# Patient Record
Sex: MALE | Race: WHITE | NOT HISPANIC OR LATINO | Employment: OTHER | URBAN - METROPOLITAN AREA
[De-identification: names, ages, dates, MRNs, and addresses within clinical notes are randomized per-mention and may not be internally consistent; named-entity substitution may affect disease eponyms.]

---

## 2019-09-27 ENCOUNTER — TRANSFERRED RECORDS (OUTPATIENT)
Dept: HEALTH INFORMATION MANAGEMENT | Facility: CLINIC | Age: 59
End: 2019-09-27

## 2019-09-27 LAB
ALT SERPL-CCNC: 24 U/L (ref 18–65)
AST SERPL-CCNC: 24 U/L (ref 10–40)
CHOLEST SERPL-MCNC: 227 MG/DL
CREAT SERPL-MCNC: 0.95 MG/DL (ref 0.8–1.5)
GLUCOSE SERPL-MCNC: 99 MG/DL (ref 60–99)
HDLC SERPL-MCNC: 49 MG/DL
LDLC SERPL CALC-MCNC: 163 MG/DL
POTASSIUM SERPL-SCNC: 4.2 MEQ/L (ref 3.5–5.1)
TRIGL SERPL-MCNC: 77 MG/DL

## 2019-11-13 ENCOUNTER — TRANSFERRED RECORDS (OUTPATIENT)
Dept: HEALTH INFORMATION MANAGEMENT | Facility: CLINIC | Age: 59
End: 2019-11-13

## 2020-12-02 NOTE — PROGRESS NOTES
Subjective     Luis Max is a 60 year old male who presents to clinic today for the following health issues:    HPI         New Patient/Transfer of Care  Hyperlipidemia Follow-Up      Are you regularly taking any medication or supplement to lower your cholesterol?   No    Are you having muscle aches or other side effects that you think could be caused by your cholesterol lowering medication?  No     Patient was previously prescribed a medication for lowering lipids (he thinks Lipitor).  He states he developed muscle aches, and due to his wife's health issues (dementia) and his need to work on remodeling projects in his home, he stopped the medication on his own.  After discussion, he understands the benefit of medication and if needed, is willing to try a different statin.      Patient Active Problem List   Diagnosis     Hyperlipidemia, unspecified hyperlipidemia type     Cataract     Colon polyps     Migraines     Past Surgical History:   Procedure Laterality Date     HERNIA REPAIR  1995     HERNIA REPAIR  1993       Social History     Tobacco Use     Smoking status: Never Smoker     Smokeless tobacco: Never Used   Substance Use Topics     Alcohol use: Yes     Comment: occasionally     Family History   Problem Relation Age of Onset     Coronary Artery Disease Mother      Heart Disease Mother      Hyperlipidemia Mother      Diabetes Father      Hyperlipidemia Father      Cerebrovascular Disease Father      Cancer Father         lymphoma           No current outpatient medications on file.     Allergies   Allergen Reactions     Buffered Aspirin      Codeine        Family History, Social History, Tobacco Use are all reviewed and updated    Review of Systems   Constitutional, HEENT, cardiovascular, pulmonary, gi and gu systems are negative, except as otherwise noted.      Objective    /78 (BP Location: Right arm, Patient Position: Chair, Cuff Size: Adult Regular)   Pulse 71   Temp 97.6  F (36.4  C)  "(Tympanic)   Ht 1.625 m (5' 3.98\")   Wt 77.1 kg (170 lb)   SpO2 96%   BMI 29.20 kg/m    Body mass index is 29.2 kg/m .  Physical Exam   GENERAL: healthy, alert and no distress  RESP: lungs clear to auscultation - no rales, rhonchi or wheezes  CV: regular rates and rhythm, normal S1 S2, no S3 or S4 and no murmur, click or rub  PSYCH: mentation appears normal, affect normal/bright          Assessment & Plan     1. Hyperlipidemia, unspecified hyperlipidemia type  Labs updated.  Form for work completed.  Will obtain records from previous clinic to update chart.  Will recommend statin if indicated.  Follow-up as directed.  - Lipid Profile (Chol, Trig, HDL, LDL calc)  - Glucose    2. Screening for malignant neoplasm of prostate  Updated.  - PSA, screen       BMI:   Estimated body mass index is 29.2 kg/m  as calculated from the following:    Height as of this encounter: 1.625 m (5' 3.98\").    Weight as of this encounter: 77.1 kg (170 lb).              Return in about 6 months (around 6/9/2021) for Medication review.    Kasey Mosquera MD  [facility]    "

## 2020-12-09 ENCOUNTER — OFFICE VISIT (OUTPATIENT)
Dept: FAMILY MEDICINE | Facility: OTHER | Age: 60
End: 2020-12-09
Attending: FAMILY MEDICINE
Payer: COMMERCIAL

## 2020-12-09 VITALS
HEART RATE: 71 BPM | OXYGEN SATURATION: 96 % | WEIGHT: 170 LBS | BODY MASS INDEX: 29.02 KG/M2 | HEIGHT: 64 IN | SYSTOLIC BLOOD PRESSURE: 126 MMHG | DIASTOLIC BLOOD PRESSURE: 78 MMHG | TEMPERATURE: 97.6 F

## 2020-12-09 DIAGNOSIS — Z12.5 SCREENING FOR MALIGNANT NEOPLASM OF PROSTATE: ICD-10-CM

## 2020-12-09 DIAGNOSIS — E78.5 HYPERLIPIDEMIA, UNSPECIFIED HYPERLIPIDEMIA TYPE: Primary | ICD-10-CM

## 2020-12-09 LAB
CHOLEST SERPL-MCNC: 321 MG/DL
GLUCOSE SERPL-MCNC: 103 MG/DL (ref 70–99)
HDLC SERPL-MCNC: 60 MG/DL
LDLC SERPL CALC-MCNC: 236 MG/DL
NONHDLC SERPL-MCNC: 261 MG/DL
PSA SERPL-ACNC: 1.09 UG/L (ref 0–4)
TRIGL SERPL-MCNC: 123 MG/DL

## 2020-12-09 PROCEDURE — 82947 ASSAY GLUCOSE BLOOD QUANT: CPT | Performed by: FAMILY MEDICINE

## 2020-12-09 PROCEDURE — 99202 OFFICE O/P NEW SF 15 MIN: CPT | Performed by: FAMILY MEDICINE

## 2020-12-09 PROCEDURE — 36415 COLL VENOUS BLD VENIPUNCTURE: CPT | Performed by: FAMILY MEDICINE

## 2020-12-09 PROCEDURE — G0103 PSA SCREENING: HCPCS | Performed by: FAMILY MEDICINE

## 2020-12-09 PROCEDURE — 80061 LIPID PANEL: CPT | Performed by: FAMILY MEDICINE

## 2020-12-09 ASSESSMENT — MIFFLIN-ST. JEOR: SCORE: 1486.73

## 2020-12-09 ASSESSMENT — ANXIETY QUESTIONNAIRES
2. NOT BEING ABLE TO STOP OR CONTROL WORRYING: NOT AT ALL
4. TROUBLE RELAXING: NOT AT ALL
7. FEELING AFRAID AS IF SOMETHING AWFUL MIGHT HAPPEN: NOT AT ALL
GAD7 TOTAL SCORE: 0
6. BECOMING EASILY ANNOYED OR IRRITABLE: NOT AT ALL
5. BEING SO RESTLESS THAT IT IS HARD TO SIT STILL: NOT AT ALL
1. FEELING NERVOUS, ANXIOUS, OR ON EDGE: NOT AT ALL
3. WORRYING TOO MUCH ABOUT DIFFERENT THINGS: NOT AT ALL

## 2020-12-09 ASSESSMENT — PAIN SCALES - GENERAL: PAINLEVEL: NO PAIN (0)

## 2020-12-09 ASSESSMENT — PATIENT HEALTH QUESTIONNAIRE - PHQ9: SUM OF ALL RESPONSES TO PHQ QUESTIONS 1-9: 0

## 2020-12-09 NOTE — NURSING NOTE
"Chief Complaint   Patient presents with     Establish Care       Initial /78 (BP Location: Right arm, Patient Position: Chair, Cuff Size: Adult Regular)   Pulse 71   Temp 97.6  F (36.4  C) (Tympanic)   Ht 1.625 m (5' 3.98\")   Wt 77.1 kg (170 lb)   SpO2 96%   BMI 29.20 kg/m   Estimated body mass index is 29.2 kg/m  as calculated from the following:    Height as of this encounter: 1.625 m (5' 3.98\").    Weight as of this encounter: 77.1 kg (170 lb).  Medication Reconciliation: complete  Stephany Vargas LPN    "

## 2020-12-09 NOTE — Clinical Note
Patient believes he was previously prescribed Lipitor, can we confirm this with [facility] (Lipitor or other statin)

## 2020-12-10 DIAGNOSIS — E78.5 HYPERLIPIDEMIA, UNSPECIFIED HYPERLIPIDEMIA TYPE: Primary | ICD-10-CM

## 2020-12-10 RX ORDER — ROSUVASTATIN CALCIUM 10 MG/1
10 TABLET, COATED ORAL DAILY
Qty: 30 TABLET | Refills: 5 | Status: SHIPPED | OUTPATIENT
Start: 2020-12-10 | End: 2021-04-07

## 2020-12-10 ASSESSMENT — ANXIETY QUESTIONNAIRES: GAD7 TOTAL SCORE: 0

## 2021-02-18 NOTE — PROGRESS NOTES
"    Assessment & Plan     1. Hyperlipidemia, unspecified hyperlipidemia type  Will check labs.  If levels have improved, would consider changing medication to every other day to see if we can improve symptoms.  Otherwise, would consider medication change to help with symptoms.  Follow-up in about three months, sooner as needed.  - Lipid Profile (Chol, Trig, HDL, LDL calc)  - ALT    2. Need for vaccination  Updated.  - SHINGRIX [[id number]]      Return in about 3 months (around 6/29/2021) for Chronic Disease Management, Medication review.    Kasey Mosquera MD  [facility] - LIZABETH Smith is a 60 year old who presents for the following health issues     HPI       Hyperlipidemia Follow-Up    Are you regularly taking any medication or supplement to lower your cholesterol?   Yes- crestor     Are you having muscle aches or other side effects that you think could be caused by your cholesterol lowering medication?  Yes- minor amount       How many servings of fruits and vegetables do you eat daily?  0-1    On average, how many sweetened beverages do you drink each day (Examples: soda, juice, sweet tea, etc.  Do NOT count diet or artificially sweetened beverages)?   1    How many days per week do you exercise enough to make your heart beat faster? 3 or less    How many minutes a day do you exercise enough to make your heart beat faster? 9 or less    How many days per week do you miss taking your medication? 0      Review of Systems   Constitutional, HEENT, cardiovascular, pulmonary, gi and gu systems are negative, except as otherwise noted.      Objective    /68 (BP Location: Left arm, Patient Position: Chair, Cuff Size: Adult Regular)   Pulse 65   Temp 97.8  F (36.6  C) (Tympanic)   Resp 16   Ht 1.625 m (5' 3.98\")   Wt 83 kg (183 lb)   SpO2 96%   BMI 31.43 kg/m    Body mass index is 31.43 kg/m .  Physical Exam   GENERAL: healthy, alert and no distress  PSYCH: mentation appears " normal, affect normal/bright

## 2021-03-29 ENCOUNTER — OFFICE VISIT (OUTPATIENT)
Dept: FAMILY MEDICINE | Facility: OTHER | Age: 61
End: 2021-03-29
Attending: FAMILY MEDICINE
Payer: COMMERCIAL

## 2021-03-29 VITALS
OXYGEN SATURATION: 96 % | DIASTOLIC BLOOD PRESSURE: 68 MMHG | BODY MASS INDEX: 31.24 KG/M2 | SYSTOLIC BLOOD PRESSURE: 124 MMHG | TEMPERATURE: 97.8 F | HEIGHT: 64 IN | WEIGHT: 183 LBS | RESPIRATION RATE: 16 BRPM | HEART RATE: 65 BPM

## 2021-03-29 DIAGNOSIS — E78.5 HYPERLIPIDEMIA, UNSPECIFIED HYPERLIPIDEMIA TYPE: Primary | ICD-10-CM

## 2021-03-29 DIAGNOSIS — Z23 NEED FOR VACCINATION: ICD-10-CM

## 2021-03-29 LAB
ALT SERPL W P-5'-P-CCNC: 37 U/L (ref 0–70)
CHOLEST SERPL-MCNC: 170 MG/DL
HDLC SERPL-MCNC: 46 MG/DL
LDLC SERPL CALC-MCNC: 97 MG/DL
NONHDLC SERPL-MCNC: 124 MG/DL
TRIGL SERPL-MCNC: 134 MG/DL

## 2021-03-29 PROCEDURE — 36415 COLL VENOUS BLD VENIPUNCTURE: CPT | Performed by: FAMILY MEDICINE

## 2021-03-29 PROCEDURE — 90750 HZV VACC RECOMBINANT IM: CPT | Performed by: FAMILY MEDICINE

## 2021-03-29 PROCEDURE — 99213 OFFICE O/P EST LOW 20 MIN: CPT | Mod: 25 | Performed by: FAMILY MEDICINE

## 2021-03-29 PROCEDURE — 84460 ALANINE AMINO (ALT) (SGPT): CPT | Performed by: FAMILY MEDICINE

## 2021-03-29 PROCEDURE — 80061 LIPID PANEL: CPT | Performed by: FAMILY MEDICINE

## 2021-03-29 PROCEDURE — 90471 IMMUNIZATION ADMIN: CPT | Performed by: FAMILY MEDICINE

## 2021-03-29 ASSESSMENT — MIFFLIN-ST. JEOR: SCORE: 1545.76

## 2021-03-29 ASSESSMENT — PAIN SCALES - GENERAL: PAINLEVEL: NO PAIN (0)

## 2021-03-29 NOTE — NURSING NOTE
"Chief Complaint   Patient presents with     Lipids       Initial /68 (BP Location: Left arm, Patient Position: Chair, Cuff Size: Adult Regular)   Pulse 65   Temp 97.8  F (36.6  C) (Tympanic)   Resp 16   Ht 1.625 m (5' 3.98\")   Wt 83 kg (183 lb)   SpO2 96%   BMI 31.43 kg/m   Estimated body mass index is 31.43 kg/m  as calculated from the following:    Height as of this encounter: 1.625 m (5' 3.98\").    Weight as of this encounter: 83 kg (183 lb).  Medication Reconciliation: complete  Stephany Vargas LPN    "

## 2021-04-07 DIAGNOSIS — E78.5 HYPERLIPIDEMIA, UNSPECIFIED HYPERLIPIDEMIA TYPE: ICD-10-CM

## 2021-04-07 RX ORDER — ROSUVASTATIN CALCIUM 10 MG/1
TABLET, COATED ORAL
Qty: 90 TABLET | Refills: 1 | Status: SHIPPED | OUTPATIENT
Start: 2021-04-07 | End: 2021-06-28

## 2021-04-07 NOTE — TELEPHONE ENCOUNTER
Rosuvastatin (CRESTOR) 10mg      Last Written Prescription Date:  12/10/2020  Last Fill Quantity: 30,   # refills: 5  Last Office Visit: 3/29/2021  Future Office visit:       Routing refill request to provider for review/approval because:

## 2021-06-18 NOTE — PROGRESS NOTES
"    Assessment & Plan     1. Hyperlipidemia, unspecified hyperlipidemia type  Labs updated, will adjust medication if needed.  Follow-up in 3-6 months, depending on results.  For now, will keep to every other day dosing of Crestor, as this is tolerated better.  - Lipid Profile (Chol, Trig, HDL, LDL calc)  - ALT    2. Excessive cerumen in both ear canals  Will refer to ENT for ear cleaning.  - OTOLARYNGOLOGY REFERRAL       BMI:   Estimated body mass index is 31.43 kg/m  as calculated from the following:    Height as of this encounter: 1.625 m (5' 3.98\").    Weight as of this encounter: 83 kg (183 lb).     Return in about 6 months (around 12/28/2021) for Chronic Disease Management, Medication review.    Kasey Mosquera MD  [facility] - LIZABETH Smith is a 61 year old who presents for the following health issues  accompanied by his spouse:    HPI     Hyperlipidemia Follow-Up      Are you regularly taking any medication or supplement to lower your cholesterol?   Yes- Crestor    Are you having muscle aches or other side effects that you think could be caused by your cholesterol lowering medication?  No      How many servings of fruits and vegetables do you eat daily?  2-3    On average, how many sweetened beverages do you drink each day (Examples: soda, juice, sweet tea, etc.  Do NOT count diet or artificially sweetened beverages)?   1    How many days per week do you exercise enough to make your heart beat faster? 7    How many minutes a day do you exercise enough to make your heart beat faster? 20 - 29    How many days per week do you miss taking your medication? 0    Acute Illness  Acute illness concerns: Ear pain Left, feeling plugged  Onset/Duration: 7 days  Symptoms:  Fever: no  Chills/Sweats: no  Headache (location?): no  Sinus Pressure: no  Conjunctivitis:  no  Ear Pain: YES: left  Rhinorrhea: no  Congestion: no  Sore Throat: no  Cough: no  Wheeze: no  Decreased Appetite: " "no  Nausea: no  Vomiting: no  Diarrhea: no  Dysuria/Freq.: no  Dysuria or Hematuria: no  Fatigue/Achiness: no  Sick/Strep Exposure: no  Therapies tried and outcome: None    Review of Systems   Constitutional, HEENT, cardiovascular, pulmonary, gi and gu systems are negative, except as otherwise noted.      Objective    /70 (BP Location: Right arm, Patient Position: Sitting, Cuff Size: Adult Regular)   Pulse 85   Temp 97.5  F (36.4  C) (Tympanic)   Resp 16   Ht 1.625 m (5' 3.98\")   Wt 83 kg (183 lb)   BMI 31.43 kg/m    Body mass index is 31.43 kg/m .  Physical Exam   GENERAL: healthy, alert and no distress  HENT: both ears: occluded with wax  PSYCH: mentation appears normal, affect normal/bright            "

## 2021-06-28 ENCOUNTER — OFFICE VISIT (OUTPATIENT)
Dept: FAMILY MEDICINE | Facility: OTHER | Age: 61
End: 2021-06-28
Attending: FAMILY MEDICINE
Payer: COMMERCIAL

## 2021-06-28 VITALS
SYSTOLIC BLOOD PRESSURE: 112 MMHG | RESPIRATION RATE: 16 BRPM | HEIGHT: 64 IN | DIASTOLIC BLOOD PRESSURE: 70 MMHG | BODY MASS INDEX: 31.24 KG/M2 | TEMPERATURE: 97.5 F | WEIGHT: 183 LBS | HEART RATE: 85 BPM

## 2021-06-28 DIAGNOSIS — E78.5 HYPERLIPIDEMIA, UNSPECIFIED HYPERLIPIDEMIA TYPE: ICD-10-CM

## 2021-06-28 DIAGNOSIS — E78.5 HYPERLIPIDEMIA, UNSPECIFIED HYPERLIPIDEMIA TYPE: Primary | ICD-10-CM

## 2021-06-28 DIAGNOSIS — H61.23 EXCESSIVE CERUMEN IN BOTH EAR CANALS: ICD-10-CM

## 2021-06-28 LAB
ALT SERPL W P-5'-P-CCNC: 47 U/L (ref 0–70)
CHOLEST SERPL-MCNC: 245 MG/DL
HDLC SERPL-MCNC: 45 MG/DL
LDLC SERPL CALC-MCNC: 167 MG/DL
NONHDLC SERPL-MCNC: 200 MG/DL
TRIGL SERPL-MCNC: 164 MG/DL

## 2021-06-28 PROCEDURE — 99213 OFFICE O/P EST LOW 20 MIN: CPT | Performed by: FAMILY MEDICINE

## 2021-06-28 PROCEDURE — 84460 ALANINE AMINO (ALT) (SGPT): CPT | Performed by: FAMILY MEDICINE

## 2021-06-28 PROCEDURE — 36415 COLL VENOUS BLD VENIPUNCTURE: CPT | Performed by: FAMILY MEDICINE

## 2021-06-28 PROCEDURE — 80061 LIPID PANEL: CPT | Performed by: FAMILY MEDICINE

## 2021-06-28 RX ORDER — ROSUVASTATIN CALCIUM 20 MG/1
20 TABLET, COATED ORAL EVERY OTHER DAY
Qty: 45 TABLET | Refills: 1 | Status: SHIPPED | OUTPATIENT
Start: 2021-06-28 | End: 2021-12-09

## 2021-06-28 ASSESSMENT — PAIN SCALES - GENERAL: PAINLEVEL: MILD PAIN (2)

## 2021-06-28 ASSESSMENT — MIFFLIN-ST. JEOR: SCORE: 1540.76

## 2021-06-28 NOTE — NURSING NOTE
"Chief Complaint   Patient presents with     Lipids       Initial /70 (BP Location: Right arm, Patient Position: Sitting, Cuff Size: Adult Regular)   Pulse 85   Temp 97.5  F (36.4  C) (Tympanic)   Resp 16   Ht 1.625 m (5' 3.98\")   Wt 83 kg (183 lb)   BMI 31.43 kg/m   Estimated body mass index is 31.43 kg/m  as calculated from the following:    Height as of this encounter: 1.625 m (5' 3.98\").    Weight as of this encounter: 83 kg (183 lb).  Medication Reconciliation: complete  Nyasia Pedro MA  "

## 2021-07-08 ENCOUNTER — TELEPHONE (OUTPATIENT)
Dept: FAMILY MEDICINE | Facility: OTHER | Age: 61
End: 2021-07-08

## 2021-07-08 NOTE — TELEPHONE ENCOUNTER
Left voicemail for patient to call back and schedule a nurse only  - immunization appointment: Shingles #2 relating to office visit on 6/28/21

## 2021-07-16 ENCOUNTER — ALLIED HEALTH/NURSE VISIT (OUTPATIENT)
Dept: FAMILY MEDICINE | Facility: OTHER | Age: 61
End: 2021-07-16
Attending: FAMILY MEDICINE
Payer: COMMERCIAL

## 2021-07-16 DIAGNOSIS — Z23 NEED FOR VACCINATION: Primary | ICD-10-CM

## 2021-07-16 PROCEDURE — 90471 IMMUNIZATION ADMIN: CPT

## 2021-07-16 PROCEDURE — 90750 HZV VACC RECOMBINANT IM: CPT

## 2021-07-30 ENCOUNTER — OFFICE VISIT (OUTPATIENT)
Dept: OTOLARYNGOLOGY | Facility: OTHER | Age: 61
End: 2021-07-30
Attending: FAMILY MEDICINE
Payer: COMMERCIAL

## 2021-07-30 VITALS
WEIGHT: 182 LBS | HEIGHT: 64 IN | SYSTOLIC BLOOD PRESSURE: 122 MMHG | OXYGEN SATURATION: 97 % | TEMPERATURE: 97.3 F | DIASTOLIC BLOOD PRESSURE: 70 MMHG | HEART RATE: 83 BPM | BODY MASS INDEX: 31.07 KG/M2

## 2021-07-30 DIAGNOSIS — H61.23 BILATERAL IMPACTED CERUMEN: ICD-10-CM

## 2021-07-30 PROCEDURE — 69210 REMOVE IMPACTED EAR WAX UNI: CPT | Mod: 50 | Performed by: NURSE PRACTITIONER

## 2021-07-30 ASSESSMENT — MIFFLIN-ST. JEOR: SCORE: 1536.23

## 2021-07-30 ASSESSMENT — PAIN SCALES - GENERAL: PAINLEVEL: NO PAIN (0)

## 2021-07-30 NOTE — LETTER
7/30/2021         RE: Luis Max  [address]  [place] [address]        Dear Colleague,    Thank you for referring your patient, Luis Max, to the [facility]. Please see a copy of my visit note below.    Otolaryngology Note         Chief Complaint:     Patient presents with:  Cerumen Impaction: Ear Cleaning           History of Present Illness:     Luis Max is a 61 year old male who presents today for ear cleaning.  He reports wax build up, left is worse.  He was seen by PCP and excessive cerumen in both ears noted.     He reports decreased hearing over time.  No current audiogram on file.  This was encouraged today, he will call if he would like to schedule    He last had ears cleaned many years ago.   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    Left ear is worse for ringing.    No vertigo, facial numbness or weakness.      No otalgia, otorrhea.    No hx of COM or otologic surgeries.          Medications:     Current Outpatient Rx   Medication Sig Dispense Refill     rosuvastatin (CRESTOR) 20 MG tablet Take 1 tablet (20 mg) by mouth every other day 45 tablet 1            Allergies:     Allergies: Buffered aspirin and Codeine          Past Medical History:     Past Medical History:   Diagnosis Date     Cataract      Colon polyps     tubular adenoma     Hyperlipidemia      Migraines             Past Surgical History:     Past Surgical History:   Procedure Laterality Date     CATARACT IOL, RT/LT  03/2016     COLONOSCOPY  11/13/2019    Dr. Papi Barrera     COLONOSCOPY - [facility] SCAN N/A 10/12/2012    H/O Colonoscopy (Acute- 10/12/12)     HERNIA REPAIR  1995     HERNIA REPAIR  1993       ENT family history reviewed         Social History:     Social History     Tobacco Use     Smoking status: Never Smoker     Smokeless tobacco: Never Used   Substance Use Topics     Alcohol use: Yes     Comment:  "occasionally     Drug use: Never            Review of Systems:     ROS: See HPI         Physical Exam:     /70   Pulse 83   Temp 97.3  F (36.3  C) (Tympanic)   Ht 1.625 m (5' 3.98\")   Wt 82.6 kg (182 lb)   SpO2 97%   BMI 31.26 kg/m      General - The patient is well nourished and well developed, and appears to have good nutritional status.  Alert and oriented to person and place, answers questions and cooperates with examination appropriately.   Head and Face - Normocephalic and atraumatic, with no gross asymmetry noted.  The facial nerve is intact, with strong symmetric movements.  Voice and Breathing - The patient was breathing comfortably without the use of accessory muscles. There was no wheezing, stridor. The patients voice was clear and strong, and had appropriate pitch and quality.  Ears - The ears were examined with binocular microscopy and with otoscope.  External ears normal. Right canal cerumen impacted.  Left canal cerumen impacted .  The right ear was cleaned with #7 sucker and cerumen loop.   Right tympanic membrane is intact without effusion, retraction or mass.  There is mild tympanosclerosis, no movement with valsalva.  The left ear was cleaned with cerumen loop and #7 sucker.  Left tympanic membrane is intact without effusion, retraction or mass.  Eyes - Extraocular movements intact, sclera were not icteric or injected, conjunctiva were pink and moist.  Mouth - Examination of the oral cavity showed pink, healthy oral mucosa. Dentition in good condition. No lesions or ulcerations noted. The tongue was mobile and midline.   Throat - The walls of the oropharynx were smooth, pink, moist, symmetric, and had no lesions or ulcerations.  The tonsillar pillars and soft palate were symmetric. The uvula was midline on elevation.    Neck - Full range of motion on passive movement.  Palpation of the occipital, submental, submandibular, internal jugular chain, and supraclavicular nodes did not " demonstrate any abnormal lymph nodes or masses.  Palpation of the thyroid was soft and smooth, with no nodules or goiter appreciated.  The trachea was mobile and midline.  Nose - External contour is symmetric, no gross deflection or scars.  Nasal mucosa is pink and moist with no abnormal mucus.  The septum and turbinates were evaluated with nasal speculum, no polyps, masses, or purulence noted on limited examination of the anterior nasal cavity.         Assessment and Plan:       ICD-10-CM    1. Bilateral impacted cerumen  H61.23      Both ears are clean and look healthy  Follow up for ear cleaning as needed  Follow up for Audiogram (hearing test) if further concerns for hearing, tinnitus, vertigo, new or worsening concerns  He reports improvement in hearing after cleaning    Nalini JUÁREZC  [facility]        Again, thank you for allowing me to participate in the care of your patient.        Sincerely,        Nalini Sewell NP

## 2021-07-30 NOTE — PROGRESS NOTES
"Otolaryngology Note         Chief Complaint:     Patient presents with:  Cerumen Impaction: Ear Cleaning           History of Present Illness:     Luis Max is a 61 year old male who presents today for ear cleaning.  He reports wax build up, left is worse.  He was seen by PCP and excessive cerumen in both ears noted.     He reports decreased hearing over time.  No current audiogram on file.  This was encouraged today, he will call if he would like to schedule    He last had ears cleaned many years ago.   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    Left ear is worse for ringing.    No vertigo, facial numbness or weakness.      No otalgia, otorrhea.    No hx of COM or otologic surgeries.          Medications:     Current Outpatient Rx   Medication Sig Dispense Refill     rosuvastatin (CRESTOR) 20 MG tablet Take 1 tablet (20 mg) by mouth every other day 45 tablet 1            Allergies:     Allergies: Buffered aspirin and Codeine          Past Medical History:     Past Medical History:   Diagnosis Date     Cataract      Colon polyps     tubular adenoma     Hyperlipidemia      Migraines             Past Surgical History:     Past Surgical History:   Procedure Laterality Date     CATARACT IOL, RT/LT  03/2016     COLONOSCOPY  11/13/2019    Dr. Papi Barrera     COLONOSCOPY - [facility] SCAN N/A 10/12/2012    H/O Colonoscopy (Acute- 10/12/12)     HERNIA REPAIR  1995     HERNIA REPAIR  1993       ENT family history reviewed         Social History:     Social History     Tobacco Use     Smoking status: Never Smoker     Smokeless tobacco: Never Used   Substance Use Topics     Alcohol use: Yes     Comment: occasionally     Drug use: Never            Review of Systems:     ROS: See HPI         Physical Exam:     /70   Pulse 83   Temp 97.3  F (36.3  C) (Tympanic)   Ht 1.625 m (5' 3.98\")   Wt 82.6 kg (182 lb)   SpO2 97%   BMI 31.26 kg/m  "     General - The patient is well nourished and well developed, and appears to have good nutritional status.  Alert and oriented to person and place, answers questions and cooperates with examination appropriately.   Head and Face - Normocephalic and atraumatic, with no gross asymmetry noted.  The facial nerve is intact, with strong symmetric movements.  Voice and Breathing - The patient was breathing comfortably without the use of accessory muscles. There was no wheezing, stridor. The patients voice was clear and strong, and had appropriate pitch and quality.  Ears - The ears were examined with binocular microscopy and with otoscope.  External ears normal. Right canal cerumen impacted.  Left canal cerumen impacted .  The right ear was cleaned with #7 sucker and cerumen loop.   Right tympanic membrane is intact without effusion, retraction or mass.  There is mild tympanosclerosis, no movement with valsalva.  The left ear was cleaned with cerumen loop and #7 sucker.  Left tympanic membrane is intact without effusion, retraction or mass.  Eyes - Extraocular movements intact, sclera were not icteric or injected, conjunctiva were pink and moist.  Mouth - Examination of the oral cavity showed pink, healthy oral mucosa. Dentition in good condition. No lesions or ulcerations noted. The tongue was mobile and midline.   Throat - The walls of the oropharynx were smooth, pink, moist, symmetric, and had no lesions or ulcerations.  The tonsillar pillars and soft palate were symmetric. The uvula was midline on elevation.    Neck - Full range of motion on passive movement.  Palpation of the occipital, submental, submandibular, internal jugular chain, and supraclavicular nodes did not demonstrate any abnormal lymph nodes or masses.  Palpation of the thyroid was soft and smooth, with no nodules or goiter appreciated.  The trachea was mobile and midline.  Nose - External contour is symmetric, no gross deflection or scars.  Nasal  mucosa is pink and moist with no abnormal mucus.  The septum and turbinates were evaluated with nasal speculum, no polyps, masses, or purulence noted on limited examination of the anterior nasal cavity.         Assessment and Plan:       ICD-10-CM    1. Bilateral impacted cerumen  H61.23      Both ears are clean and look healthy  Follow up for ear cleaning as needed  Follow up for Audiogram (hearing test) if further concerns for hearing, tinnitus, vertigo, new or worsening concerns  He reports improvement in hearing after cleaning    Nalini Sewell NP-C  [facility]NT

## 2021-07-30 NOTE — NURSING NOTE
"Chief Complaint   Patient presents with     Cerumen Impaction     Ear Cleaning       Initial /70   Pulse 83   Temp 97.3  F (36.3  C) (Tympanic)   Ht 1.625 m (5' 3.98\")   Wt 82.6 kg (182 lb)   SpO2 97%   BMI 31.26 kg/m   Estimated body mass index is 31.26 kg/m  as calculated from the following:    Height as of this encounter: 1.625 m (5' 3.98\").    Weight as of this encounter: 82.6 kg (182 lb).  Medication Reconciliation: complete  Colette Kaur LPN  "

## 2021-07-30 NOTE — PATIENT INSTRUCTIONS
Both ears are clean and look healthy  Follow up for ear cleaning as needed  Follow up for Audiogram (hearing test) if further concerns for hearing, tinnitus, vertigo, new or worsening concerns      Thank you for allowing Nalini SOLARES and our ENT team to participate in your care.  If your medications are too expensive, please call my nurse at the number listed below.  We can possibly change this medication.    If you have a scheduling or an appointment question please contact our Health Unit Coordinator at their direct line [phone] ext [phone]  ALL nursing questions or concerns can be directed to my Nurse Jacquelyn [phone].

## 2021-12-08 ENCOUNTER — LAB (OUTPATIENT)
Dept: LAB | Facility: OTHER | Age: 61
End: 2021-12-08
Payer: COMMERCIAL

## 2021-12-08 DIAGNOSIS — Z13.1 SCREENING FOR DIABETES MELLITUS: ICD-10-CM

## 2021-12-08 DIAGNOSIS — E78.5 HYPERLIPIDEMIA, UNSPECIFIED HYPERLIPIDEMIA TYPE: ICD-10-CM

## 2021-12-08 DIAGNOSIS — E78.5 HYPERLIPIDEMIA, UNSPECIFIED HYPERLIPIDEMIA TYPE: Primary | ICD-10-CM

## 2021-12-08 DIAGNOSIS — Z12.5 SCREENING FOR MALIGNANT NEOPLASM OF PROSTATE: ICD-10-CM

## 2021-12-08 LAB
ALT SERPL W P-5'-P-CCNC: 32 U/L (ref 0–70)
CHOLEST SERPL-MCNC: 171 MG/DL
FASTING STATUS PATIENT QL REPORTED: YES
FASTING STATUS PATIENT QL REPORTED: YES
GLUCOSE BLD-MCNC: 101 MG/DL (ref 70–99)
HDLC SERPL-MCNC: 44 MG/DL
HOLD SPECIMEN: NORMAL
LDLC SERPL CALC-MCNC: 101 MG/DL
NONHDLC SERPL-MCNC: 127 MG/DL
PSA SERPL-MCNC: 1.13 UG/L (ref 0–4)
TRIGL SERPL-MCNC: 129 MG/DL

## 2021-12-08 PROCEDURE — 82947 ASSAY GLUCOSE BLOOD QUANT: CPT

## 2021-12-08 PROCEDURE — G0103 PSA SCREENING: HCPCS

## 2021-12-08 PROCEDURE — 84460 ALANINE AMINO (ALT) (SGPT): CPT

## 2021-12-08 PROCEDURE — 36415 COLL VENOUS BLD VENIPUNCTURE: CPT

## 2021-12-08 PROCEDURE — 80061 LIPID PANEL: CPT

## 2021-12-09 ENCOUNTER — OFFICE VISIT (OUTPATIENT)
Dept: FAMILY MEDICINE | Facility: OTHER | Age: 61
End: 2021-12-09
Attending: FAMILY MEDICINE
Payer: COMMERCIAL

## 2021-12-09 VITALS
OXYGEN SATURATION: 95 % | SYSTOLIC BLOOD PRESSURE: 118 MMHG | BODY MASS INDEX: 30.9 KG/M2 | TEMPERATURE: 97.8 F | HEIGHT: 64 IN | WEIGHT: 181 LBS | DIASTOLIC BLOOD PRESSURE: 68 MMHG | HEART RATE: 66 BPM | RESPIRATION RATE: 18 BRPM

## 2021-12-09 DIAGNOSIS — E78.5 HYPERLIPIDEMIA, UNSPECIFIED HYPERLIPIDEMIA TYPE: Primary | ICD-10-CM

## 2021-12-09 PROCEDURE — 99213 OFFICE O/P EST LOW 20 MIN: CPT | Performed by: FAMILY MEDICINE

## 2021-12-09 RX ORDER — ROSUVASTATIN CALCIUM 20 MG/1
20 TABLET, COATED ORAL EVERY OTHER DAY
Qty: 45 TABLET | Refills: 3 | Status: SHIPPED | OUTPATIENT
Start: 2021-12-09 | End: 2022-12-20

## 2021-12-09 ASSESSMENT — PAIN SCALES - GENERAL: PAINLEVEL: NO PAIN (0)

## 2021-12-09 ASSESSMENT — MIFFLIN-ST. JEOR: SCORE: 1532.01

## 2021-12-09 NOTE — PROGRESS NOTES
"  Assessment & Plan     1. Hyperlipidemia, unspecified hyperlipidemia type  No medication changes recommended at this time.  Current dose renewed.  Follow-up in six months, lab orders placed to complete prior to next visit.  - rosuvastatin (CRESTOR) 20 MG tablet; Take 1 tablet (20 mg) by mouth every other day  Dispense: 45 tablet; Refill: 3  - Lipid Profile (Chol, Trig, HDL, LDL calc); Future  - ALT; Future     BMI:   Estimated body mass index is 31.07 kg/m  as calculated from the following:    Height as of this encounter: 1.626 m (5' 4\").    Weight as of this encounter: 82.1 kg (181 lb).     Return in about 6 months (around 6/9/2022) for Chronic Disease Management, Medication review.    Kasey Mosquera MD  [facility] - LIZABETH Smith is a 61 year old who presents for the following health issues     HPI     Hyperlipidemia Follow-Up    Are you regularly taking any medication or supplement to lower your cholesterol?   Yes- rosuvastatin    Are you having muscle aches or other side effects that you think could be caused by your cholesterol lowering medication?  No     Patient states he is tolerating medication dosed every other day.      How many servings of fruits and vegetables do you eat daily?  0-1    On average, how many sweetened beverages do you drink each day (Examples: soda, juice, sweet tea, etc.  Do NOT count diet or artificially sweetened beverages)?   0    How many days per week do you exercise enough to make your heart beat faster? 3 or less    How many minutes a day do you exercise enough to make your heart beat faster? 9 or less    How many days per week do you miss taking your medication? 0      Review of Systems   Constitutional, HEENT, cardiovascular, pulmonary, gi and gu systems are negative, except as otherwise noted.      Objective    /68   Pulse 66   Temp 97.8  F (36.6  C) (Tympanic)   Resp 18   Ht 1.626 m (5' 4\")   Wt 82.1 kg (181 lb)   SpO2 95%   BMI " 31.07 kg/m    Body mass index is 31.07 kg/m .  Physical Exam   GENERAL: healthy, alert and no distress  PSYCH: mentation appears normal, affect normal/bright    Lab on 12/08/2021   Component Date Value Ref Range Status     Cholesterol 12/08/2021 171  <200 mg/dL Final     Triglycerides 12/08/2021 129  <150 mg/dL Final     Direct Measure HDL 12/08/2021 44  >=40 mg/dL Final     LDL Cholesterol Calculated 12/08/2021 101* <=100 mg/dL Final     Non HDL Cholesterol 12/08/2021 127  <130 mg/dL Final     Patient Fasting > 8hrs? 12/08/2021 Yes   Final     ALT 12/08/2021 32  0 - 70 U/L Final     Prostate Specific Antigen Screen 12/08/2021 1.13  0.00 - 4.00 ug/L Final     Glucose 12/08/2021 101* 70 - 99 mg/dL Final     Patient Fasting > 8hrs? 12/08/2021 Yes   Final     Hold Specimen 12/08/2021 JI   Final

## 2021-12-09 NOTE — NURSING NOTE
"Chief Complaint   Patient presents with     Hyperlipidemia       Initial /68   Pulse 66   Temp 97.8  F (36.6  C) (Tympanic)   Resp 18   Ht 1.626 m (5' 4\")   Wt 82.1 kg (181 lb)   SpO2 95%   BMI 31.07 kg/m   Estimated body mass index is 31.07 kg/m  as calculated from the following:    Height as of this encounter: 1.626 m (5' 4\").    Weight as of this encounter: 82.1 kg (181 lb).  Medication Reconciliation: complete  Delmi Rios LPN  "

## 2022-06-09 ENCOUNTER — LAB (OUTPATIENT)
Dept: LAB | Facility: OTHER | Age: 62
End: 2022-06-09
Payer: COMMERCIAL

## 2022-06-09 DIAGNOSIS — E78.5 HYPERLIPIDEMIA, UNSPECIFIED HYPERLIPIDEMIA TYPE: ICD-10-CM

## 2022-06-09 LAB
ALT SERPL W P-5'-P-CCNC: 38 U/L (ref 0–70)
CHOLEST SERPL-MCNC: 191 MG/DL
FASTING STATUS PATIENT QL REPORTED: YES
HDLC SERPL-MCNC: 45 MG/DL
LDLC SERPL CALC-MCNC: 120 MG/DL
NONHDLC SERPL-MCNC: 146 MG/DL
TRIGL SERPL-MCNC: 130 MG/DL

## 2022-06-09 PROCEDURE — 80061 LIPID PANEL: CPT

## 2022-06-09 PROCEDURE — 84460 ALANINE AMINO (ALT) (SGPT): CPT

## 2022-06-09 PROCEDURE — 36415 COLL VENOUS BLD VENIPUNCTURE: CPT

## 2022-06-09 NOTE — PROGRESS NOTES
"  Assessment & Plan     1. Hyperlipidemia, unspecified hyperlipidemia type  No changes to current dose of Crestor, refills available through next appointment.  Follow-up in 6 months, future lab orders will be placed.        BMI:   Estimated body mass index is 31.58 kg/m  as calculated from the following:    Height as of this encounter: 1.626 m (5' 4\").    Weight as of this encounter: 83.5 kg (184 lb).     Return in about 6 months (around 12/13/2022) for Chronic Disease Management, Medication review.    Kasey Mosquera MD  [facility] - [place] is a 61 year old who presents for the following health issues     HPI     Hyperlipidemia Follow-Up      Are you regularly taking any medication or supplement to lower your cholesterol?   Yes- crestor    Are you having muscle aches or other side effects that you think could be caused by your cholesterol lowering medication?  No      Review of Systems   Constitutional, HEENT, cardiovascular, pulmonary, gi and gu systems are negative, except as otherwise noted.      Objective    /72 (BP Location: Left arm, Patient Position: Sitting, Cuff Size: Adult Regular)   Pulse 67   Temp 97.2  F (36.2  C) (Tympanic)   Resp 18   Ht 1.626 m (5' 4\")   Wt 83.5 kg (184 lb)   SpO2 93%   BMI 31.58 kg/m    Body mass index is 31.58 kg/m .  Physical Exam   GENERAL: healthy, alert and no distress  PSYCH: mentation appears normal, affect normal/bright    Lab on 06/09/2022   Component Date Value Ref Range Status     Cholesterol 06/09/2022 191  <200 mg/dL Final     Triglycerides 06/09/2022 130  <150 mg/dL Final     Direct Measure HDL 06/09/2022 45  >=40 mg/dL Final     LDL Cholesterol Calculated 06/09/2022 120 (A) <=100 mg/dL Final     Non HDL Cholesterol 06/09/2022 146 (A) <130 mg/dL Final     Patient Fasting > 8hrs? 06/09/2022 Yes   Final     ALT 06/09/2022 38  0 - 70 U/L Final             "

## 2022-06-13 ENCOUNTER — OFFICE VISIT (OUTPATIENT)
Dept: FAMILY MEDICINE | Facility: OTHER | Age: 62
End: 2022-06-13
Attending: FAMILY MEDICINE
Payer: COMMERCIAL

## 2022-06-13 VITALS
HEART RATE: 67 BPM | HEIGHT: 64 IN | RESPIRATION RATE: 18 BRPM | DIASTOLIC BLOOD PRESSURE: 72 MMHG | BODY MASS INDEX: 31.41 KG/M2 | WEIGHT: 184 LBS | TEMPERATURE: 97.2 F | OXYGEN SATURATION: 93 % | SYSTOLIC BLOOD PRESSURE: 112 MMHG

## 2022-06-13 DIAGNOSIS — E78.5 HYPERLIPIDEMIA, UNSPECIFIED HYPERLIPIDEMIA TYPE: Primary | ICD-10-CM

## 2022-06-13 DIAGNOSIS — Z12.5 SCREENING FOR MALIGNANT NEOPLASM OF PROSTATE: ICD-10-CM

## 2022-06-13 PROCEDURE — 99214 OFFICE O/P EST MOD 30 MIN: CPT | Performed by: FAMILY MEDICINE

## 2022-06-13 ASSESSMENT — PAIN SCALES - GENERAL: PAINLEVEL: NO PAIN (0)

## 2022-06-13 NOTE — NURSING NOTE
"Chief Complaint   Patient presents with     Lipids     Headache       Initial /72 (BP Location: Left arm, Patient Position: Sitting, Cuff Size: Adult Regular)   Pulse 67   Temp 97.2  F (36.2  C) (Tympanic)   Resp 18   Ht 1.626 m (5' 4\")   Wt 83.5 kg (184 lb)   SpO2 93%   BMI 31.58 kg/m   Estimated body mass index is 31.58 kg/m  as calculated from the following:    Height as of this encounter: 1.626 m (5' 4\").    Weight as of this encounter: 83.5 kg (184 lb).  Medication Reconciliation: complete  Nyasia Pedro MA  "

## 2022-12-19 ENCOUNTER — LAB (OUTPATIENT)
Dept: LAB | Facility: OTHER | Age: 62
End: 2022-12-19
Payer: COMMERCIAL

## 2022-12-19 DIAGNOSIS — Z12.5 SCREENING FOR MALIGNANT NEOPLASM OF PROSTATE: ICD-10-CM

## 2022-12-19 DIAGNOSIS — E78.5 HYPERLIPIDEMIA, UNSPECIFIED HYPERLIPIDEMIA TYPE: ICD-10-CM

## 2022-12-19 LAB
ALT SERPL W P-5'-P-CCNC: 39 U/L (ref 10–50)
CHOLEST SERPL-MCNC: 192 MG/DL
FASTING STATUS PATIENT QL REPORTED: YES
GLUCOSE SERPL-MCNC: 107 MG/DL (ref 70–99)
HDLC SERPL-MCNC: 48 MG/DL
LDLC SERPL CALC-MCNC: 124 MG/DL
NONHDLC SERPL-MCNC: 144 MG/DL
PSA SERPL-MCNC: 1.01 NG/ML (ref 0–4.5)
TRIGL SERPL-MCNC: 100 MG/DL

## 2022-12-19 PROCEDURE — G0103 PSA SCREENING: HCPCS

## 2022-12-19 PROCEDURE — 82947 ASSAY GLUCOSE BLOOD QUANT: CPT

## 2022-12-19 PROCEDURE — 84460 ALANINE AMINO (ALT) (SGPT): CPT

## 2022-12-19 PROCEDURE — 36415 COLL VENOUS BLD VENIPUNCTURE: CPT

## 2022-12-19 PROCEDURE — 80061 LIPID PANEL: CPT

## 2022-12-19 NOTE — PROGRESS NOTES
"  Assessment & Plan     1. Hyperlipidemia, unspecified hyperlipidemia type  Labs reviewed, medication refilled.  Follow-up in six months, sooner as needed.  - rosuvastatin (CRESTOR) 20 MG tablet; Take 1 tablet (20 mg) by mouth every other day  Dispense: 45 tablet; Refill: 3    2. Migraine without status migrainosus, not intractable, unspecified migraine type  No changes, monitor symptoms    3. Family history of abdominal aortic aneurysm  Screening ordered  - [place] Medicare AAA Screening; Future          BMI:   Estimated body mass index is 32.15 kg/m  as calculated from the following:    Height as of this encounter: 1.626 m (5' 4\").    Weight as of this encounter: 85 kg (187 lb 4.8 oz).     Return in about 6 months (around 6/20/2023) for Chronic Disease Management, Medication review.    Kasey Mosquera MD  [facility] - [place] is a 62 year old presenting for the following health issues:  Lipids and Headache      HPI     Hyperlipidemia Follow-Up      Are you regularly taking any medication or supplement to lower your cholesterol?   Yes- crestor    Are you having muscle aches or other side effects that you think could be caused by your cholesterol lowering medication?  No    Migraine     Since your last clinic visit, how have your headaches changed?  Improved    How often are you getting headaches or migraines? Has not had any     Are you able to do normal daily activities when you have a migraine? No    Are you taking rescue/relief medications? (Select all that apply) No    How helpful is your rescue/relief medication?  The relief is inconsistent    Are you taking any medications to prevent migraines? (Select all that apply)  No    In the past 4 weeks, how often have you gone to urgent care or the emergency room because of your headaches?  0      Patient does have a positive family history of AAA - his father was diagnosed with one around the patient's current age.  He wonders " "about screening.    Review of Systems   Constitutional, HEENT, cardiovascular, pulmonary, gi and gu systems are negative, except as otherwise noted.      Objective    /68 (BP Location: Left arm, Patient Position: Sitting, Cuff Size: Adult Regular)   Pulse 91   Temp 97.4  F (36.3  C) (Tympanic)   Resp 18   Ht 1.626 m (5' 4\")   Wt 85 kg (187 lb 4.8 oz)   SpO2 95%   BMI 32.15 kg/m    Body mass index is 32.15 kg/m .  Physical Exam   GENERAL: healthy, alert and no distress  PSYCH: mentation appears normal, affect normal/bright    Lab on 12/19/2022   Component Date Value Ref Range Status     Prostate Specific Antigen Screen 12/19/2022 1.01  0.00 - 4.50 ng/mL Final     Glucose 12/19/2022 107 (H)  70 - 99 mg/dL Final     Patient Fasting > 8hrs? 12/19/2022 Yes   Final     ALT 12/19/2022 39  10 - 50 U/L Final     Cholesterol 12/19/2022 192  <200 mg/dL Final     Triglycerides 12/19/2022 100  <150 mg/dL Final     Direct Measure HDL 12/19/2022 48  >=40 mg/dL Final     LDL Cholesterol Calculated 12/19/2022 124 (H)  <=100 mg/dL Final     Non HDL Cholesterol 12/19/2022 144 (H)  <130 mg/dL Final                  "

## 2022-12-20 ENCOUNTER — OFFICE VISIT (OUTPATIENT)
Dept: FAMILY MEDICINE | Facility: OTHER | Age: 62
End: 2022-12-20
Attending: FAMILY MEDICINE
Payer: COMMERCIAL

## 2022-12-20 ENCOUNTER — TELEPHONE (OUTPATIENT)
Dept: FAMILY MEDICINE | Facility: OTHER | Age: 62
End: 2022-12-20

## 2022-12-20 VITALS
TEMPERATURE: 97.4 F | OXYGEN SATURATION: 95 % | BODY MASS INDEX: 31.98 KG/M2 | HEART RATE: 91 BPM | DIASTOLIC BLOOD PRESSURE: 68 MMHG | SYSTOLIC BLOOD PRESSURE: 120 MMHG | WEIGHT: 187.3 LBS | RESPIRATION RATE: 18 BRPM | HEIGHT: 64 IN

## 2022-12-20 DIAGNOSIS — E78.5 HYPERLIPIDEMIA, UNSPECIFIED HYPERLIPIDEMIA TYPE: Primary | ICD-10-CM

## 2022-12-20 DIAGNOSIS — Z82.49 FAMILY HISTORY OF ABDOMINAL AORTIC ANEURYSM: ICD-10-CM

## 2022-12-20 DIAGNOSIS — G43.909 MIGRAINE WITHOUT STATUS MIGRAINOSUS, NOT INTRACTABLE, UNSPECIFIED MIGRAINE TYPE: ICD-10-CM

## 2022-12-20 DIAGNOSIS — Z82.49 FAMILY HISTORY OF ABDOMINAL AORTIC ANEURYSM: Primary | ICD-10-CM

## 2022-12-20 PROCEDURE — 99214 OFFICE O/P EST MOD 30 MIN: CPT | Performed by: FAMILY MEDICINE

## 2022-12-20 RX ORDER — ROSUVASTATIN CALCIUM 20 MG/1
20 TABLET, COATED ORAL EVERY OTHER DAY
Qty: 45 TABLET | Refills: 3 | Status: SHIPPED | OUTPATIENT
Start: 2022-12-20 | End: 2023-01-19

## 2022-12-20 ASSESSMENT — PAIN SCALES - GENERAL: PAINLEVEL: NO PAIN (0)

## 2023-01-12 ENCOUNTER — HOSPITAL ENCOUNTER (OUTPATIENT)
Dept: ULTRASOUND IMAGING | Facility: HOSPITAL | Age: 63
Discharge: HOME | End: 2023-01-12
Attending: FAMILY MEDICINE | Admitting: FAMILY MEDICINE
Payer: COMMERCIAL

## 2023-01-12 DIAGNOSIS — Z82.49 FAMILY HISTORY OF ABDOMINAL AORTIC ANEURYSM: ICD-10-CM

## 2023-01-12 PROCEDURE — 76775 US EXAM ABDO BACK WALL LIM: CPT

## 2023-01-18 DIAGNOSIS — E78.5 HYPERLIPIDEMIA, UNSPECIFIED HYPERLIPIDEMIA TYPE: ICD-10-CM

## 2023-01-19 RX ORDER — ROSUVASTATIN CALCIUM 20 MG/1
TABLET, COATED ORAL
Qty: 45 TABLET | Refills: 3 | Status: SHIPPED | OUTPATIENT
Start: 2023-01-19 | End: 2024-01-02

## 2023-01-19 NOTE — TELEPHONE ENCOUNTER
Rosuvastatin 20mg      Last Written Prescription Date:  12/20/22  Last Fill Quantity: 45,   # refills: 3  Last Office Visit: 12/20/22  Future Office visit:       Routing refill request to provider for review/approval because:

## 2023-06-29 DIAGNOSIS — E78.5 HYPERLIPIDEMIA, UNSPECIFIED HYPERLIPIDEMIA TYPE: Primary | ICD-10-CM

## 2023-06-30 ENCOUNTER — LAB (OUTPATIENT)
Dept: LAB | Facility: OTHER | Age: 63
End: 2023-06-30
Payer: COMMERCIAL

## 2023-06-30 DIAGNOSIS — E78.5 HYPERLIPIDEMIA, UNSPECIFIED HYPERLIPIDEMIA TYPE: ICD-10-CM

## 2023-06-30 LAB
ALT SERPL W P-5'-P-CCNC: 32 U/L (ref 0–70)
CHOLEST SERPL-MCNC: 188 MG/DL
HDLC SERPL-MCNC: 48 MG/DL
LDLC SERPL CALC-MCNC: 117 MG/DL
NONHDLC SERPL-MCNC: 140 MG/DL
TRIGL SERPL-MCNC: 113 MG/DL

## 2023-06-30 PROCEDURE — 36415 COLL VENOUS BLD VENIPUNCTURE: CPT

## 2023-06-30 PROCEDURE — 80061 LIPID PANEL: CPT

## 2023-06-30 PROCEDURE — 84460 ALANINE AMINO (ALT) (SGPT): CPT

## 2023-06-30 NOTE — PROGRESS NOTES
"  Assessment & Plan     1. Hyperlipidemia, unspecified hyperlipidemia type  No changes to current medication, will refill when due.  Follow-up in six months, labs orders placed as well.         BMI:   Estimated body mass index is 31.57 kg/m  as calculated from the following:    Height as of this encounter: 1.626 m (5' 4\").    Weight as of this encounter: 83.4 kg (183 lb 14.4 oz).     Return in about 6 months (around 1/3/2024) for Chronic Disease Management, Medication review.    Kasey Mosquera MD  [facility] - [place] is a 63 year old, presenting for the following health issues:  Lipids and RECHECK (6 Month)      HPI     Hyperlipidemia Follow-Up      Are you regularly taking any medication or supplement to lower your cholesterol?   Yes- Rosuvastatin    Are you having muscle aches or other side effects that you think could be caused by your cholesterol lowering medication?  No        Review of Systems   Constitutional, HEENT, cardiovascular, pulmonary, gi and gu systems are negative, except as otherwise noted.      Objective    /78   Pulse 68   Temp 97.6  F (36.4  C) (Tympanic)   Resp 16   Ht 1.626 m (5' 4\")   Wt 83.4 kg (183 lb 14.4 oz)   SpO2 (!) 68%   BMI 31.57 kg/m    Body mass index is 31.57 kg/m .  Physical Exam   GENERAL: healthy, alert and no distress  PSYCH: mentation appears normal, affect normal/bright    Lab on 06/30/2023   Component Date Value Ref Range Status     ALT 06/30/2023 32  0 - 70 U/L Final    Reference intervals for this test were updated on 6/12/2023 to more accurately reflect our healthy population. There may be differences in the flagging of prior results with similar values performed with this method. Interpretation of those prior results can be made in the context of the updated reference intervals.       Cholesterol 06/30/2023 188  <200 mg/dL Final     Triglycerides 06/30/2023 113  <150 mg/dL Final     Direct Measure HDL 06/30/2023 48  " >=40 mg/dL Final     LDL Cholesterol Calculated 06/30/2023 117 (H)  <=100 mg/dL Final     Non HDL Cholesterol 06/30/2023 140 (H)  <130 mg/dL Final

## 2023-07-03 ENCOUNTER — OFFICE VISIT (OUTPATIENT)
Dept: FAMILY MEDICINE | Facility: OTHER | Age: 63
End: 2023-07-03
Attending: FAMILY MEDICINE
Payer: COMMERCIAL

## 2023-07-03 VITALS
TEMPERATURE: 97.6 F | RESPIRATION RATE: 16 BRPM | DIASTOLIC BLOOD PRESSURE: 78 MMHG | HEART RATE: 68 BPM | WEIGHT: 183.9 LBS | BODY MASS INDEX: 31.4 KG/M2 | HEIGHT: 64 IN | OXYGEN SATURATION: 68 % | SYSTOLIC BLOOD PRESSURE: 110 MMHG

## 2023-07-03 DIAGNOSIS — Z12.5 SCREENING FOR MALIGNANT NEOPLASM OF PROSTATE: ICD-10-CM

## 2023-07-03 DIAGNOSIS — Z13.1 SCREENING FOR DIABETES MELLITUS: ICD-10-CM

## 2023-07-03 DIAGNOSIS — E78.5 HYPERLIPIDEMIA, UNSPECIFIED HYPERLIPIDEMIA TYPE: Primary | ICD-10-CM

## 2023-07-03 PROCEDURE — 99213 OFFICE O/P EST LOW 20 MIN: CPT | Performed by: FAMILY MEDICINE

## 2023-07-03 ASSESSMENT — PAIN SCALES - GENERAL: PAINLEVEL: NO PAIN (0)

## 2024-01-02 ENCOUNTER — OFFICE VISIT (OUTPATIENT)
Dept: FAMILY MEDICINE | Facility: OTHER | Age: 64
End: 2024-01-02
Attending: FAMILY MEDICINE
Payer: COMMERCIAL

## 2024-01-02 VITALS
HEIGHT: 64 IN | BODY MASS INDEX: 31.77 KG/M2 | RESPIRATION RATE: 16 BRPM | SYSTOLIC BLOOD PRESSURE: 132 MMHG | WEIGHT: 186.1 LBS | TEMPERATURE: 97.3 F | HEART RATE: 66 BPM | DIASTOLIC BLOOD PRESSURE: 62 MMHG | OXYGEN SATURATION: 98 %

## 2024-01-02 DIAGNOSIS — Z13.1 SCREENING FOR DIABETES MELLITUS: ICD-10-CM

## 2024-01-02 DIAGNOSIS — E78.5 HYPERLIPIDEMIA, UNSPECIFIED HYPERLIPIDEMIA TYPE: Primary | ICD-10-CM

## 2024-01-02 DIAGNOSIS — Z12.5 SCREENING FOR MALIGNANT NEOPLASM OF PROSTATE: ICD-10-CM

## 2024-01-02 PROCEDURE — 99214 OFFICE O/P EST MOD 30 MIN: CPT | Performed by: FAMILY MEDICINE

## 2024-01-02 RX ORDER — ROSUVASTATIN CALCIUM 20 MG/1
20 TABLET, COATED ORAL EVERY OTHER DAY
Qty: 45 TABLET | Refills: 3 | Status: SHIPPED | OUTPATIENT
Start: 2024-01-02

## 2024-01-02 ASSESSMENT — PAIN SCALES - GENERAL: PAINLEVEL: NO PAIN (0)

## 2024-01-02 NOTE — PROGRESS NOTES
"  Assessment & Plan     1. Hyperlipidemia, unspecified hyperlipidemia type  Lipids and ALT ordered, medication renewed.  Follow-up in six months, sooner as needed.  - rosuvastatin (CRESTOR) 20 MG tablet; Take 1 tablet (20 mg) by mouth every other day  Dispense: 45 tablet; Refill: 3    2. Screening for diabetes mellitus  Fasting glucose ordered today, will review when available.    3. Screening for malignant neoplasm of prostate  PSA ordered today.          BMI:   Estimated body mass index is 31.94 kg/m  as calculated from the following:    Height as of this encounter: 1.626 m (5' 4\").    Weight as of this encounter: 84.4 kg (186 lb 1.6 oz).     Return in about 6 months (around 7/2/2024) for Chronic Disease Management, Medication review.    Kasey Mosquera MD  [facility] - [place] is a 63 year old, presenting for the following health issues:  Lipids      HPI     Hyperlipidemia Follow-Up    Are you regularly taking any medication or supplement to lower your cholesterol?   Yes- Crestor  Are you having muscle aches or other side effects that you think could be caused by your cholesterol lowering medication?  No    Patient does have future lab orders already in place, those are reviewed and released for use today.      Review of Systems   Constitutional, HEENT, cardiovascular, pulmonary, gi and gu systems are negative, except as otherwise noted.      Objective    /62 (BP Location: Left arm, Patient Position: Sitting, Cuff Size: Adult Regular)   Pulse 66   Temp 97.3  F (36.3  C) (Tympanic)   Resp 16   Ht 1.626 m (5' 4\")   Wt 84.4 kg (186 lb 1.6 oz)   SpO2 98%   BMI 31.94 kg/m    Body mass index is 31.94 kg/m .  Physical Exam   GENERAL: healthy, alert and no distress  PSYCH: mentation appears normal, affect normal/bright                  "

## 2024-09-10 ENCOUNTER — OFFICE VISIT (OUTPATIENT)
Dept: FAMILY MEDICINE | Facility: OTHER | Age: 64
End: 2024-09-10
Attending: FAMILY MEDICINE
Payer: COMMERCIAL

## 2024-09-10 VITALS
WEIGHT: 182.1 LBS | SYSTOLIC BLOOD PRESSURE: 122 MMHG | HEART RATE: 65 BPM | DIASTOLIC BLOOD PRESSURE: 75 MMHG | BODY MASS INDEX: 31.09 KG/M2 | TEMPERATURE: 97.5 F | HEIGHT: 64 IN | RESPIRATION RATE: 16 BRPM | OXYGEN SATURATION: 94 %

## 2024-09-10 DIAGNOSIS — Z86.0100 HISTORY OF COLONIC POLYPS: ICD-10-CM

## 2024-09-10 DIAGNOSIS — E78.5 HYPERLIPIDEMIA, UNSPECIFIED HYPERLIPIDEMIA TYPE: ICD-10-CM

## 2024-09-10 DIAGNOSIS — Z12.5 SCREENING FOR MALIGNANT NEOPLASM OF PROSTATE: ICD-10-CM

## 2024-09-10 DIAGNOSIS — Z11.59 NEED FOR HEPATITIS C SCREENING TEST: ICD-10-CM

## 2024-09-10 DIAGNOSIS — R73.09 ELEVATED GLUCOSE: ICD-10-CM

## 2024-09-10 DIAGNOSIS — Z87.898 HISTORY OF MULTIPLE PULMONARY NODULES: ICD-10-CM

## 2024-09-10 DIAGNOSIS — Z12.11 COLON CANCER SCREENING: ICD-10-CM

## 2024-09-10 DIAGNOSIS — Z13.1 SCREENING FOR DIABETES MELLITUS: ICD-10-CM

## 2024-09-10 DIAGNOSIS — Z23 NEED FOR VACCINATION: ICD-10-CM

## 2024-09-10 DIAGNOSIS — Z00.00 ENCOUNTER FOR MEDICARE ANNUAL WELLNESS EXAM: Primary | ICD-10-CM

## 2024-09-10 LAB
ALT SERPL W P-5'-P-CCNC: 31 U/L (ref 0–70)
CHOLEST SERPL-MCNC: 191 MG/DL
FASTING STATUS PATIENT QL REPORTED: YES
FASTING STATUS PATIENT QL REPORTED: YES
GLUCOSE SERPL-MCNC: 104 MG/DL (ref 70–99)
HDLC SERPL-MCNC: 49 MG/DL
HOLD SPECIMEN: NORMAL
LDLC SERPL CALC-MCNC: 116 MG/DL
NONHDLC SERPL-MCNC: 142 MG/DL
PSA SERPL DL<=0.01 NG/ML-MCNC: 1.56 NG/ML (ref 0–4.5)
TRIGL SERPL-MCNC: 131 MG/DL

## 2024-09-10 PROCEDURE — G0463 HOSPITAL OUTPT CLINIC VISIT: HCPCS | Mod: 25

## 2024-09-10 PROCEDURE — G0103 PSA SCREENING: HCPCS | Mod: ZL | Performed by: FAMILY MEDICINE

## 2024-09-10 PROCEDURE — 36415 COLL VENOUS BLD VENIPUNCTURE: CPT | Mod: ZL | Performed by: FAMILY MEDICINE

## 2024-09-10 PROCEDURE — 99213 OFFICE O/P EST LOW 20 MIN: CPT | Mod: 25 | Performed by: FAMILY MEDICINE

## 2024-09-10 PROCEDURE — 80061 LIPID PANEL: CPT | Mod: ZL | Performed by: FAMILY MEDICINE

## 2024-09-10 PROCEDURE — 84460 ALANINE AMINO (ALT) (SGPT): CPT | Mod: ZL | Performed by: FAMILY MEDICINE

## 2024-09-10 PROCEDURE — 82947 ASSAY GLUCOSE BLOOD QUANT: CPT | Mod: ZL | Performed by: FAMILY MEDICINE

## 2024-09-10 PROCEDURE — 83036 HEMOGLOBIN GLYCOSYLATED A1C: CPT | Mod: ZL | Performed by: FAMILY MEDICINE

## 2024-09-10 PROCEDURE — G0402 INITIAL PREVENTIVE EXAM: HCPCS | Performed by: FAMILY MEDICINE

## 2024-09-10 PROCEDURE — 86803 HEPATITIS C AB TEST: CPT | Mod: ZL | Performed by: FAMILY MEDICINE

## 2024-09-10 PROCEDURE — G0009 ADMIN PNEUMOCOCCAL VACCINE: HCPCS

## 2024-09-10 ASSESSMENT — PAIN SCALES - GENERAL: PAINLEVEL: NO PAIN (0)

## 2024-09-10 NOTE — PROGRESS NOTES
"Preventive Care Visit  [facility]  Kasey [place] MD, Family Medicine  Sep 10, 2024      Assessment & Plan       ICD-10-CM    1. Encounter for Medicare annual wellness exam  Z00.00       2. Hyperlipidemia, unspecified hyperlipidemia type  E78.5 Lipid Profile (Chol, Trig, HDL, LDL calc)     ALT     Lipid Profile (Chol, Trig, HDL, LDL calc)     ALT      3. Screening for diabetes mellitus  Z13.1 Glucose     Glucose      4. Screening for malignant neoplasm of prostate  Z12.5 PSA, screen     PSA, screen      5. Need for hepatitis C screening test  Z11.59 Hepatitis C Screen Reflex to HCV RNA Quant and Genotype     Hepatitis C Screen Reflex to HCV RNA Quant and Genotype      6. Colon cancer screening  Z12.11 Colonoscopy Screening  Referral      7. History of colonic polyps  Z86.010 Colonoscopy Screening  Referral      8. History of multiple pulmonary nodules  Z87.898 CT Chest w/o Contrast      9. Need for vaccination  Z23 PNEUMOCOCCAL 20 VALENT CONJUGATE (PREVNAR 20)        Chronic conditions are stable - labs updated, medication will be renewed when due  Colonoscopy referral ordered  Patient notes history of pulmonary nodules seen on CXR - CT ordered for further evaluation  PCV updated today    Patient has been advised of split billing requirements and indicates understanding: Yes        BMI  Estimated body mass index is 31.26 kg/m  as calculated from the following:    Height as of this encounter: 1.626 m (5' 4\").    Weight as of this encounter: 82.6 kg (182 lb 1.6 oz).       Counseling  Appropriate preventive services were addressed with this patient via screening, questionnaire, or discussion as appropriate for fall prevention, nutrition, physical activity, Tobacco-use cessation, social engagement, weight loss and cognition.  Checklist reviewing preventive services available has been given to the patient.  Reviewed patient's diet, addressing concerns and/or questions.   He is at risk for lack of " exercise and has been provided with information to increase physical activity for the benefit of his well-being.       Return in about 6 months (around 3/10/2025) for Chronic Disease Management.      Nilson Smith is a 64 year old, presenting for the following:  Physical        9/10/2024     8:42 AM   Additional Questions   Roomed by Josselin DUQUE   Accompanied by None        Health Care Directive  Patient does not have a Health Care Directive or Living Will: Discussed advance care planning with patient; however, patient declined at this time.    HPI    Hyperlipidemia Follow-Up    Are you regularly taking any medication or supplement to lower your cholesterol?   Yes- Crestor  Are you having muscle aches or other side effects that you think could be caused by your cholesterol lowering medication?  No    Patient is due for colonoscopy.    Patient states when he worked in the [employer] and had a yearly physical there, he was told he had pulmonary nodules on his CXR.  He states this was never evaluated further.  He wonders if this needs to be looked into more.        9/10/2024   General Health   How would you rate your overall physical health? Good   Feel stress (tense, anxious, or unable to sleep) Not at all            9/10/2024   Nutrition   Diet: Regular (no restrictions)            9/10/2024   Exercise   Days per week of moderate/strenous exercise 3 days            9/10/2024   Social Factors   Frequency of gathering with friends or relatives More than three times a week   Worry food won't last until get money to buy more No   Food not last or not have enough money for food? No   Do you have housing? (Housing is defined as stable permanent housing and does not include staying ouside in a car, in a tent, in an abandoned building, in an overnight shelter, or couch-surfing.) Yes   Are you worried about losing your housing? No   Lack of transportation? No   Unable to get utilities (heat,electricity)? No            9/10/2024    Fall Risk   Fallen 2 or more times in the past year? No   Trouble with walking or balance? No             9/10/2024   Activities of Daily Living- Home Safety   Needs help with the following daily activites None of the above   Safety concerns in the home None of the above            9/10/2024   Dental   Dentist two times every year? Yes            9/10/2024   Hearing Screening   Hearing concerns? None of the above            9/10/2024   Driving Risk Screening   Patient/family members have concerns about driving No            9/10/2024   General Alertness/Fatigue Screening   Have you been more tired than usual lately? No            9/10/2024   Urinary Incontinence Screening   Bothered by leaking urine in past 6 months No            9/10/2024   TB Screening   Were you born outside of the [place]? No                  9/10/2024   Substance Use   Alcohol more than 3/day or more than 7/wk No   Do you have a current opioid prescription? No   How severe/bad is pain from 1 to 10? 0/10 (No Pain)   Do you use any other substances recreationally? No        Social History     Tobacco Use    Smoking status: Never    Smokeless tobacco: Never   Vaping Use    Vaping status: Never Used   Substance Use Topics    Alcohol use: Yes     Comment: occasionally    Drug use: Never           9/10/2024   AAA Screening   Family history of Abdominal Aortic Aneurysm (AAA)? (!) YES - patient's previous ultrasound was normal            9/10/2024   One time HIV Screening   Previous HIV test? No      Last PSA:   PSA   Date Value Ref Range Status   12/09/2020 1.09 0 - 4 ug/L Final     Comment:     Assay Method:  Chemiluminescence using Siemens Vista analyzer     Prostate Specific Antigen Screen   Date Value Ref Range Status   01/02/2024 1.28 0.00 - 4.50 ng/mL Final   12/08/2021 1.13 0.00 - 4.00 ug/L Final     ASCVD Risk   The 10-year ASCVD risk score (Matty GÓMEZ, et al., 2019) is: 11.3%    Values used to calculate the score:      Age: 64 years       Sex: Male      Is Non- : No      Diabetic: No      Tobacco smoker: No      Systolic Blood Pressure: 122 mmHg      Is BP treated: No      HDL Cholesterol: 46 mg/dL      Total Cholesterol: 177 mg/dL          Reviewed and updated as needed this visit by Provider   Tobacco  Allergies  Meds  Problems  Med Hx  Surg Hx  Fam Hx            Patient Active Problem List   Diagnosis    Hyperlipidemia, unspecified hyperlipidemia type    Cataract    Colon polyps    Migraines     Past Surgical History:   Procedure Laterality Date    CATARACT IOL, RT/LT  03/2016    COLONOSCOPY  11/13/2019    Dr. Papi Barrera    COLONOSCOPY - HIM SCAN N/A 10/12/2012    H/O Colonoscopy (Acute- 10/12/12)    HERNIA REPAIR  1995    HERNIA REPAIR  1993       Social History     Tobacco Use    Smoking status: Never    Smokeless tobacco: Never   Substance Use Topics    Alcohol use: Yes     Comment: occasionally     Family History   Problem Relation Age of Onset    Coronary Artery Disease Mother     Heart Disease Mother     Hyperlipidemia Mother     Diabetes Father     Hyperlipidemia Father     Cerebrovascular Disease Father     Cancer Father         lymphoma         Current Outpatient Medications   Medication Sig Dispense Refill    rosuvastatin (CRESTOR) 20 MG tablet Take 1 tablet (20 mg) by mouth every other day 45 tablet 3     Allergies   Allergen Reactions    Aspirin Buf(Cacarb-Mgcarb-Mgo)     Codeine      Current providers sharing in care for this patient include:  Patient Care Team:  Kasey Mosquera MD as PCP - General (Family Medicine)  Kasey Mosquera MD as Assigned PCP    The following health maintenance items are reviewed in Epic and correct as of today:  Health Maintenance   Topic Date Due    HIV SCREENING  Never done    HEPATITIS C SCREENING  Never done    MEDICARE ANNUAL WELLNESS VISIT  Never done    INFLUENZA VACCINE (1) Never done    COVID-19 Vaccine (3 - 2023-24 season) 09/01/2024    COLORECTAL CANCER  "SCREENING  11/13/2024    LIPID  01/02/2025    FALL RISK ASSESSMENT  09/10/2025    GLUCOSE  01/02/2027    DTAP/TDAP/TD IMMUNIZATION (2 - Td or Tdap) 09/26/2028    ADVANCE CARE PLANNING  09/10/2029    RSV VACCINE (1 - 1-dose 75+ series) 06/12/2034    PHQ-2 (once per calendar year)  Completed    Pneumococcal Vaccine: 65+ Years  Completed    ZOSTER IMMUNIZATION  Completed    HPV IMMUNIZATION  Aged Out    MENINGITIS IMMUNIZATION  Aged Out    RSV MONOCLONAL ANTIBODY  Aged Out         Review of Systems  Constitutional, HEENT, cardiovascular, pulmonary, gi and gu systems are negative, except as otherwise noted.     Objective    Exam  /75 (BP Location: Left arm, Patient Position: Sitting, Cuff Size: Adult Large)   Pulse 65   Temp 97.5  F (36.4  C) (Tympanic)   Resp 16   Ht 1.626 m (5' 4\")   Wt 82.6 kg (182 lb 1.6 oz)   SpO2 94%   BMI 31.26 kg/m     Estimated body mass index is 31.26 kg/m  as calculated from the following:    Height as of this encounter: 1.626 m (5' 4\").    Weight as of this encounter: 82.6 kg (182 lb 1.6 oz).    Physical Exam  GENERAL: alert and no distress  EYES: Eyes grossly normal to inspection, PERRL and conjunctivae and sclerae normal  HENT: ear canals and TM's normal, nose and mouth without ulcers or lesions  NECK: no adenopathy and no carotid bruits  RESP: lungs clear to auscultation - no rales, rhonchi or wheezes  CV: regular rates and rhythm, normal S1 S2, no S3 or S4, and no murmur, click or rub  ABDOMEN: soft, nontender, no hepatosplenomegaly, no masses and bowel sounds normal  MS: no gross musculoskeletal defects noted, no edema  SKIN: no suspicious lesions or rashes  NEURO: Normal strength and tone, mentation intact and speech normal  PSYCH: mentation appears normal, affect normal/bright        9/10/2024   Mini Cog   Clock Draw Score 2 Normal   3 Item Recall 3 objects recalled   Mini Cog Total Score 5                Signed Electronically by: Kasey Mosquera MD    "

## 2024-09-11 ENCOUNTER — PATIENT OUTREACH (OUTPATIENT)
Dept: GASTROENTEROLOGY | Facility: CLINIC | Age: 64
End: 2024-09-11

## 2024-09-11 ENCOUNTER — TELEPHONE (OUTPATIENT)
Dept: FAMILY MEDICINE | Facility: OTHER | Age: 64
End: 2024-09-11

## 2024-09-11 LAB — HCV AB SERPL QL IA: NONREACTIVE

## 2024-09-12 ENCOUNTER — TELEPHONE (OUTPATIENT)
Dept: FAMILY MEDICINE | Facility: OTHER | Age: 64
End: 2024-09-12

## 2024-09-12 DIAGNOSIS — Z12.11 SCREENING FOR COLON CANCER: Primary | ICD-10-CM

## 2024-09-12 DIAGNOSIS — R73.09 ELEVATED GLUCOSE: Primary | ICD-10-CM

## 2024-09-12 LAB
EST. AVERAGE GLUCOSE BLD GHB EST-MCNC: 131 MG/DL
HBA1C MFR BLD: 6.2 %

## 2024-09-12 NOTE — TELEPHONE ENCOUNTER
Screening Questions for the Scheduling of Screening Colonoscopies     (If Colonoscopy is diagnostic, Provider should review the chart before scheduling.)    Are you younger than 50 or older than 80?  No    Do you take aspirin or fish oil?  No (if yes, tell patient to stop 1 week prior to Colonoscopy)    Do you take warfarin (Coumadin), clopidogrel (Plavix), apixaban (Eliquis), dabigatram (Pradaxa), rivaroxaban (Xarelto) or any blood thinner? No    Do you use oxygen at home?  No    Do you have kidney disease? No    Are you on dialysis? No    Have you had a stroke or heart attack in the last year? No    Have you had a stent in your heart or any blood vessel in the last year? No    Have you had a transplant of any organ?  No    Have you had a colonoscopy or upper endoscopy (EGD) before?  YES. Date-.         Date of scheduled Colonoscopy: 11/18/24    Provider:  Dr Mendez    Pharmacy [facility]

## 2024-09-13 DIAGNOSIS — R73.03 PREDIABETES: Primary | ICD-10-CM

## 2024-09-13 RX ORDER — METFORMIN HCL 500 MG
TABLET, EXTENDED RELEASE 24 HR ORAL
Qty: 63 TABLET | Refills: 0 | Status: SHIPPED | OUTPATIENT
Start: 2024-09-13 | End: 2024-10-18

## 2024-09-16 ENCOUNTER — HOSPITAL ENCOUNTER (OUTPATIENT)
Dept: CT IMAGING | Facility: HOSPITAL | Age: 64
Discharge: HOME | End: 2024-09-16
Attending: FAMILY MEDICINE | Admitting: FAMILY MEDICINE
Payer: COMMERCIAL

## 2024-09-16 DIAGNOSIS — Z87.898 HISTORY OF MULTIPLE PULMONARY NODULES: ICD-10-CM

## 2024-09-16 PROCEDURE — 71250 CT THORAX DX C-: CPT

## 2024-09-16 RX ORDER — POLYETHYLENE GLYCOL 3350 17 G/17G
POWDER, FOR SOLUTION ORAL
Qty: 238 G | Refills: 0 | Status: SHIPPED | OUTPATIENT
Start: 2024-09-16

## 2024-09-16 RX ORDER — BISACODYL 5 MG/1
TABLET, DELAYED RELEASE ORAL
Qty: 4 TABLET | Refills: 0 | Status: SHIPPED | OUTPATIENT
Start: 2024-09-16

## 2024-09-16 NOTE — TELEPHONE ENCOUNTER
Patient scheduled for screening colonoscopy with Dr. Miramontes on 11/18/24.Miralax bowel preparation and script sent to [facility] .   Patient does not need a pre-op. Guide to your Colonoscopy or Upper GI Endoscopy and prep instructions sent to patient via [employer] Mail.  Patient requested miralax/gatorade bowel prep, message to Dr. Miramontes about alternate and prep, he approves miralax.   Patient prefers later in the day for schedule.

## 2024-09-16 NOTE — TELEPHONE ENCOUNTER
Patient notified via booklet instructions to use sugar free sports drink with miralax prep per Dr. Miramontes.

## 2024-10-29 DIAGNOSIS — R73.03 PREDIABETES: ICD-10-CM

## 2024-10-30 NOTE — TELEPHONE ENCOUNTER
"METFORMIN HCL  MG TABLET         Last Written Prescription Date:  9/13/24  Last Fill Quantity: 63,   # refills: 0  Last Office Visit: 9/10/24  Future Office visit:       Routing refill request to provider for review/approval because:  Biguanide Agents Zctjbt10/29/2024 08:19 PM   Protocol Details Has GFR on file in past 12 months and most recent value is normal     No results found for: \"GFRESTIMATED\"      "

## 2024-10-31 RX ORDER — METFORMIN HYDROCHLORIDE 500 MG/1
500 TABLET, EXTENDED RELEASE ORAL 2 TIMES DAILY WITH MEALS
Qty: 60 TABLET | Refills: 5 | Status: SHIPPED | OUTPATIENT
Start: 2024-10-31

## 2024-11-11 ENCOUNTER — ANESTHESIA EVENT (OUTPATIENT)
Dept: SURGERY | Facility: HOSPITAL | Age: 64
End: 2024-11-11
Payer: COMMERCIAL

## 2024-11-11 NOTE — ANESTHESIA PREPROCEDURE EVALUATION
Anesthesia Pre-Procedure Evaluation    Patient: Luis Max   MRN: [id number] : 1960        Procedure : Procedure(s):  COLONOSCOPY          Past Medical History:   Diagnosis Date     Cataract      Colon polyps     tubular adenoma     Hyperlipidemia      Migraines       Past Surgical History:   Procedure Laterality Date     CATARACT IOL, RT/LT  2016     COLONOSCOPY  2019    Dr. Papi Barrera     COLONOSCOPY - HIM SCAN N/A 10/12/2012    H/O Colonoscopy (Acute- 10/12/12)     HERNIA REPAIR       HERNIA REPAIR        Allergies   Allergen Reactions     Aspirin Buf(Cacarb-Mgcarb-Mgo)      Codeine       Social History     Tobacco Use     Smoking status: Never     Smokeless tobacco: Never   Substance Use Topics     Alcohol use: Yes     Comment: occasionally      Wt Readings from Last 1 Encounters:   09/10/24 82.6 kg (182 lb 1.6 oz)        Anesthesia Evaluation   Pt has had prior anesthetic. Type: MAC and General.    History of anesthetic complications  - PONV.      ROS/MED HX  ENT/Pulmonary:       Neurologic:  - neg neurologic ROS     Cardiovascular:     (+) Dyslipidemia - -   -  - -                                      METS/Exercise Tolerance: >4 METS    Hematologic:  - neg hematologic  ROS     Musculoskeletal:  - neg musculoskeletal ROS     GI/Hepatic:     (+)        bowel prep,            Renal/Genitourinary:  - neg Renal ROS     Endo: Comment: Prediabetes - A1C 6.2 2024 (started on Metformin)    (+)               Obesity,       Psychiatric/Substance Use:  - neg psychiatric ROS     Infectious Disease:  - neg infectious disease ROS     Malignancy:  - neg malignancy ROS     Other:  - neg other ROS          Physical Exam    Airway        Mallampati: II       Respiratory Devices and Support         Dental       (+) Modest Abnormalities - crowns, retainers, 1 or 2 missing teeth      Cardiovascular   cardiovascular exam normal          Pulmonary   pulmonary exam normal            OUTSIDE  "LABS:  CBC: No results found for: \"WBC\", \"HGB\", \"HCT\", \"PLT\"  BMP:   Lab Results   Component Value Date    POTASSIUM 4.2 09/27/2019    CR 0.95 09/27/2019     (H) 09/10/2024    GLC 99 01/02/2024     COAGS: No results found for: \"PTT\", \"INR\", \"FIBR\"  POC: No results found for: \"BGM\", \"HCG\", \"HCGS\"  HEPATIC:   Lab Results   Component Value Date    ALT 31 09/10/2024    AST 24 09/27/2019     OTHER:   Lab Results   Component Value Date    A1C 6.2 (H) 09/10/2024       Anesthesia Plan    ASA Status:  2    NPO Status:  NPO Appropriate    Anesthesia Type: MAC.     - Reason for MAC: straight local not clinically adequate   Induction: Intravenous, Propofol.   Maintenance: Balanced.        Consents    Anesthesia Plan(s) and associated risks, benefits, and realistic alternatives discussed. Questions answered and patient/representative(s) expressed understanding.     - Discussed: Risks, Benefits and Alternatives for BOTH SEDATION and the PROCEDURE were discussed     - Discussed with:  Patient      - Extended Intubation/Ventilatory Support Discussed: No.      - Patient is DNR/DNI Status: No     Use of blood products discussed: No .     Postoperative Care            Comments:    Other Comments: Risks and benefits of MAC anesthetic discussed including dental damage, aspiration, loss of airway, conversion to general anesthetic, CV complications, MI, stroke, death. Pt wishes to proceed. Chart reviewed hl - same day surg HP    Did not put orders in yet - Lactated ringers flagging due to allergy to aspirin buff? Need to look into this -            Armida Chavez, APRN CNP    I have reviewed the pertinent notes and labs in the chart from the past 30 days. Any updates or changes from those notes are reflected in this note.                                   "

## 2024-11-18 ENCOUNTER — ANESTHESIA (OUTPATIENT)
Dept: SURGERY | Facility: HOSPITAL | Age: 64
End: 2024-11-18
Payer: COMMERCIAL

## 2024-11-18 ENCOUNTER — HOSPITAL ENCOUNTER (OUTPATIENT)
Facility: HOSPITAL | Age: 64
Discharge: HOME | End: 2024-11-18
Attending: SURGERY | Admitting: SURGERY
Payer: COMMERCIAL

## 2024-11-18 VITALS
DIASTOLIC BLOOD PRESSURE: 93 MMHG | TEMPERATURE: 97 F | RESPIRATION RATE: 16 BRPM | HEIGHT: 64 IN | OXYGEN SATURATION: 97 % | WEIGHT: 174 LBS | SYSTOLIC BLOOD PRESSURE: 119 MMHG | BODY MASS INDEX: 29.71 KG/M2 | HEART RATE: 77 BPM

## 2024-11-18 PROCEDURE — 45385 COLONOSCOPY W/LESION REMOVAL: CPT | Mod: PT | Performed by: SURGERY

## 2024-11-18 PROCEDURE — 710N000012 HC RECOVERY PHASE 2, PER MINUTE: Performed by: SURGERY

## 2024-11-18 PROCEDURE — 360N000075 HC SURGERY LEVEL 2, PER MIN: Performed by: SURGERY

## 2024-11-18 PROCEDURE — 88305 TISSUE EXAM BY PATHOLOGIST: CPT | Mod: 26 | Performed by: PATHOLOGY

## 2024-11-18 PROCEDURE — 88305 TISSUE EXAM BY PATHOLOGIST: CPT | Mod: TC | Performed by: SURGERY

## 2024-11-18 PROCEDURE — 370N000017 HC ANESTHESIA TECHNICAL FEE, PER MIN: Performed by: SURGERY

## 2024-11-18 PROCEDURE — 258N000003 HC RX IP 258 OP 636: Performed by: NURSE ANESTHETIST, CERTIFIED REGISTERED

## 2024-11-18 PROCEDURE — 250N000011 HC RX IP 250 OP 636: Performed by: NURSE ANESTHETIST, CERTIFIED REGISTERED

## 2024-11-18 PROCEDURE — 272N000001 HC OR GENERAL SUPPLY STERILE: Performed by: SURGERY

## 2024-11-18 PROCEDURE — 999N000141 HC STATISTIC PRE-PROCEDURE NURSING ASSESSMENT: Performed by: SURGERY

## 2024-11-18 PROCEDURE — 250N000009 HC RX 250: Performed by: NURSE ANESTHETIST, CERTIFIED REGISTERED

## 2024-11-18 RX ORDER — ONDANSETRON 2 MG/ML
4 INJECTION INTRAMUSCULAR; INTRAVENOUS EVERY 30 MIN PRN
Status: DISCONTINUED | OUTPATIENT
Start: 2024-11-18 | End: 2024-11-18 | Stop reason: HOSPADM

## 2024-11-18 RX ORDER — LIDOCAINE HYDROCHLORIDE 20 MG/ML
INJECTION, SOLUTION INFILTRATION; PERINEURAL PRN
Status: DISCONTINUED | OUTPATIENT
Start: 2024-11-18 | End: 2024-11-18

## 2024-11-18 RX ORDER — NALOXONE HYDROCHLORIDE 0.4 MG/ML
0.1 INJECTION, SOLUTION INTRAMUSCULAR; INTRAVENOUS; SUBCUTANEOUS
Status: DISCONTINUED | OUTPATIENT
Start: 2024-11-18 | End: 2024-11-18 | Stop reason: HOSPADM

## 2024-11-18 RX ORDER — LIDOCAINE 40 MG/G
CREAM TOPICAL
Status: DISCONTINUED | OUTPATIENT
Start: 2024-11-18 | End: 2024-11-18 | Stop reason: HOSPADM

## 2024-11-18 RX ORDER — SODIUM CHLORIDE, SODIUM LACTATE, POTASSIUM CHLORIDE, CALCIUM CHLORIDE 600; 310; 30; 20 MG/100ML; MG/100ML; MG/100ML; MG/100ML
INJECTION, SOLUTION INTRAVENOUS CONTINUOUS PRN
Status: DISCONTINUED | OUTPATIENT
Start: 2024-11-18 | End: 2024-11-18

## 2024-11-18 RX ORDER — ONDANSETRON 4 MG/1
4 TABLET, ORALLY DISINTEGRATING ORAL EVERY 30 MIN PRN
Status: DISCONTINUED | OUTPATIENT
Start: 2024-11-18 | End: 2024-11-18 | Stop reason: HOSPADM

## 2024-11-18 RX ORDER — DEXAMETHASONE SODIUM PHOSPHATE 10 MG/ML
4 INJECTION, SOLUTION INTRAMUSCULAR; INTRAVENOUS
Status: DISCONTINUED | OUTPATIENT
Start: 2024-11-18 | End: 2024-11-18 | Stop reason: HOSPADM

## 2024-11-18 RX ORDER — PROPOFOL 10 MG/ML
INJECTION, EMULSION INTRAVENOUS CONTINUOUS PRN
Status: DISCONTINUED | OUTPATIENT
Start: 2024-11-18 | End: 2024-11-18

## 2024-11-18 RX ORDER — PROPOFOL 10 MG/ML
INJECTION, EMULSION INTRAVENOUS PRN
Status: DISCONTINUED | OUTPATIENT
Start: 2024-11-18 | End: 2024-11-18

## 2024-11-18 RX ORDER — ONDANSETRON 2 MG/ML
INJECTION INTRAMUSCULAR; INTRAVENOUS PRN
Status: DISCONTINUED | OUTPATIENT
Start: 2024-11-18 | End: 2024-11-18

## 2024-11-18 RX ORDER — SODIUM CHLORIDE, SODIUM LACTATE, POTASSIUM CHLORIDE, CALCIUM CHLORIDE 600; 310; 30; 20 MG/100ML; MG/100ML; MG/100ML; MG/100ML
INJECTION, SOLUTION INTRAVENOUS CONTINUOUS
Status: DISCONTINUED | OUTPATIENT
Start: 2024-11-18 | End: 2024-11-18 | Stop reason: HOSPADM

## 2024-11-18 RX ADMIN — PROPOFOL 100 MG: 10 INJECTION, EMULSION INTRAVENOUS at 12:23

## 2024-11-18 RX ADMIN — PROPOFOL 20 MG: 10 INJECTION, EMULSION INTRAVENOUS at 12:26

## 2024-11-18 RX ADMIN — SODIUM CHLORIDE, POTASSIUM CHLORIDE, SODIUM LACTATE AND CALCIUM CHLORIDE: 600; 310; 30; 20 INJECTION, SOLUTION INTRAVENOUS at 10:38

## 2024-11-18 RX ADMIN — PROPOFOL 200 MCG/KG/MIN: 10 INJECTION, EMULSION INTRAVENOUS at 12:24

## 2024-11-18 RX ADMIN — LIDOCAINE HYDROCHLORIDE 100 MG: 20 INJECTION, SOLUTION INFILTRATION; PERINEURAL at 12:23

## 2024-11-18 RX ADMIN — SODIUM CHLORIDE, POTASSIUM CHLORIDE, SODIUM LACTATE AND CALCIUM CHLORIDE: 600; 310; 30; 20 INJECTION, SOLUTION INTRAVENOUS at 12:21

## 2024-11-18 RX ADMIN — ONDANSETRON 4 MG: 2 INJECTION INTRAMUSCULAR; INTRAVENOUS at 12:22

## 2024-11-18 ASSESSMENT — ACTIVITIES OF DAILY LIVING (ADL)
ADLS_ACUITY_SCORE: 0

## 2024-11-18 NOTE — DISCHARGE INSTRUCTIONS
After Anesthesia (Sleep Medicine)  What should I do after anesthesia?  You should rest and relax for the next 24 hours. Avoid risky or difficult (strenuous) activity. A responsible adult should stay with you overnight.  Don't drive or use any heavy equipment for 24 hours. Even if you feel normal, your reactions may be affected by the sleep medicine given to you.  Don't drink alcohol or make any important decisions for 24 hours.  Slowly get back to your regular diet, as you feel able.  How should I expect to feel?  It's normal to feel dizzy, light-headed, or faint for up to a full day after anesthesia or while taking pain medicine. If this happens:   Sit down for a few minutes before standing.  Have someone help you when you get up to walk or use the bathroom.  If you have nausea (feel sick to your stomach) or vomit (throw up):   Drink clear liquids (such as apple juice, ginger ale, broth, or 7UP) until you feel better.  If you feel sick to your stomach, or you keep vomiting for 24 hours, please call the doctor.  What else should I know?  You might have a dry mouth, sore throat, muscle aches, or trouble sleeping. These should go away after 24 hours.  Please contact your doctor if you have any other symptoms that concern you, such as fever, pain, bleeding, fluid drainage, swelling, or headache, or if it's been over 8 to 10 hours and you still aren't able to pee (urinate).  If you have a history of sleep apnea, it's very important to use your CPAP machine for the next 24 hours when you nap or sleep.   For informational purposes only. Not to replace the advice of your health care provider. Copyright   2023 [employer]. All rights reserved. Clinically reviewed by Aly Tipton MD. [employer] [id number] - REV 09/23.  Colonoscopy: What to Expect at Home  Your Recovery  After a colonoscopy, you'll stay at the clinic until you wake up. Then you can go home. But you'll need to arrange for a ride. Your doctor will  tell you when you can eat and do your other usual activities.  Your doctor will talk to you about when you'll need your next colonoscopy. Your doctor can help you decide how often you need to be checked. This will depend on the results of your test and your risk for colorectal cancer.  After the test, you may be bloated or have gas pains. You may need to pass gas. If a biopsy was done or a polyp was removed, you may have streaks of blood in your stool (feces) for a few days. Problems such as heavy rectal bleeding may not occur until several weeks after the test. This isn't common. But it can happen after polyps are removed.  This care sheet gives you a general idea about how long it will take for you to recover. But each person recovers at a different pace. Follow the steps below to get better as quickly as possible.  How can you care for yourself at home?  Activity    Rest when you feel tired.     You can do your normal activities when it feels okay to do so.   Diet    Follow your doctor's directions for eating.     Unless your doctor has told you not to, drink plenty of fluids. This helps to replace the fluids that were lost during the colon prep.     Do not drink alcohol.   Medicines    Your doctor will tell you if and when you can restart your medicines. You will also be given instructions about taking any new medicines.     If you stopped taking aspirin or some other blood thinner, your doctor will tell you when to start taking it again.     If polyps were removed or a biopsy was done during the test, your doctor may tell you not to take aspirin or other anti-inflammatory medicines for a few days. These include ibuprofen (Advil, Motrin) and naproxen (Aleve).   Other instructions    For your safety, do not drive or operate machinery until the medicine wears off and you can think clearly. Your doctor may tell you not to drive or operate machinery until the day after your test.     Do not sign legal documents or  "make major decisions until the medicine wears off and you can think clearly. The anesthesia can make it hard for you to fully understand what you are agreeing to.   Follow-up care is a key part of your treatment and safety. Be sure to make and go to all appointments, and call your doctor if you are having problems. It's also a good idea to know your test results and keep a list of the medicines you take.  When should you call for help?   Call 911 anytime you think you may need emergency care. For example, call if:    You passed out (lost consciousness).     You pass maroon or bloody stools.     You have trouble breathing.   Call your doctor now or seek immediate medical care if:    You have pain that does not get better after you take pain medicine.     You are sick to your stomach or cannot drink fluids.     You have new or worse belly pain.     You have blood in your stools.     You have a fever.     You cannot pass stools or gas.   Watch closely for changes in your health, and be sure to contact your doctor if you have any problems.  Where can you learn more?  Go to https://www.nextsocial.net/patiented  Enter E264 in the search box to learn more about \"Colonoscopy: What to Expect at Home.\"  Current as of: October 25, 2023  Content Version: 14.2 2024 [facility] [employer].   Care instructions adapted under license by your healthcare professional. If you have questions about a medical condition or this instruction, always ask your healthcare professional. [employer] disclaims any warranty or liability for your use of this information.      "

## 2024-11-18 NOTE — ANESTHESIA CARE TRANSFER NOTE
Patient: Luis Max    Procedure: Procedure(s):  COLONOSCOPY WITH POLYPECTOMY       Diagnosis: Screening for colon cancer [Z12.11]  Diagnosis Additional Information: No value filed.    Anesthesia Type:   MAC     Note:    Oropharynx: spontaneously breathing  Level of Consciousness: drowsy  Oxygen Supplementation: room air    Independent Airway: airway patency satisfactory and stable  Dentition: dentition unchanged  Vital Signs Stable: post-procedure vital signs reviewed and stable  Report to RN Given: handoff report given  Patient transferred to: Phase II    Handoff Report: Identifed the Patient, Identified the Reponsible Provider, Reviewed the pertinent medical history, Discussed the surgical course, Reviewed Intra-OP anesthesia mangement and issues during anesthesia, Set expectations for post-procedure period and Allowed opportunity for questions and acknowledgement of understanding  Vitals:  Vitals Value Taken Time   BP     Temp     Pulse     Resp     SpO2         Electronically Signed By: DARELL Blue CRNA  November 18, 2024  12:42 PM

## 2024-11-18 NOTE — H&P
"Colonoscopy CONSULT  11/18/2024    Patient:Luis Max  Referring Physician: Kasey Camarena MD    Reason for Referral: Screening colonoscopy    HPI:  Luis Max is a very pleasant 64 year old male [facility].  Patient's aunt had colon cancer and he has a personal history of polyps.  Last colonoscopy was in 2019.  He does not take blood thinners.  He has never had abdominal surgery.    Past Medical History:  Past Medical History:   Diagnosis Date    Cataract     Colon polyps     tubular adenoma    Hyperlipidemia     Migraines        Past Surgical History:  Past Surgical History:   Procedure Laterality Date    CATARACT IOL, RT/LT  03/2016    COLONOSCOPY  11/13/2019    Dr. Papi Barrera    COLONOSCOPY - HIM SCAN N/A 10/12/2012    H/O Colonoscopy (Acute- 10/12/12)    HERNIA REPAIR  1995    HERNIA REPAIR  1993       Family History History:  Family History   Problem Relation Age of Onset    Coronary Artery Disease Mother     Heart Disease Mother     Hyperlipidemia Mother     Diabetes Father     Hyperlipidemia Father     Cerebrovascular Disease Father     Cancer Father         lymphoma       History of Tobacco Use:  History   Smoking Status    Never   Smokeless Tobacco    Never       Current Medications:  No current outpatient medications on file.       Allergies:  Allergies   Allergen Reactions    Aspirin Buf(Cacarb-Mgcarb-Mgo)     Codeine        ROS:  Pertinent items are noted in HPI.    PHYSICAL EXAM:     Vital signs: /81   Pulse 79   Temp 97.8  F (36.6  C)   Resp 16   Ht 1.626 m (5' 4\")   Wt 78.9 kg (174 lb)   SpO2 92%   BMI 29.87 kg/m     Weight: [unfilled]   BMI: Body mass index is 29.87 kg/m .   General: No apparent distress   Skin: No rashes or readily discernible lesions   Neck: Neck supple, symmetric and without palpable adenopathy or masses   Lungs: Nonlabored breathing on room air.  Clear to auscultation bilaterally   CV: Regular rate and " rhythm on auscultation   Abdominal: Soft, nontender, nondistended   Extremities: No peripheral edema    Labs:  Reviewed    Imaging:  N/a    ASSESSMENT:  Luis Max is a very pleasant 64 year old male presenting for screening colonoscopy.    Discussed risks and benefits of colonoscopy including bleeding, perforation, and missed lesion.  Patient demonstrated understanding and wishes to proceed.    PLAN:  Proceed with colonoscopy

## 2024-11-18 NOTE — OP NOTE
REPORT OF OPERATION  DATE OF PROCEDURE: 11/18/2024    PATIENT: Luis Max    SURGERY PERFORMED: Colonoscopy    PREOPERATIVE DIAGNOSIS: Screening colonoscopy    POSTOPERATIVE DIAGNOSIS:    Same   Normal colonoscopy   Diverticulosis was not identified.   Hemorrhoids were not  identified.    SURGEON: Andrew Miramontes MD    ASSISTANTS: None    ANESTHESIA: Monitored Anesthesia Care    COMPLICATIONS: None apparent    ESTIMATED BLOOD LOSS: * No values recorded between 11/18/2024 12:24 PM and 11/18/2024 12:41 PM *    TRANSFUSIONS: None    TISSUE TO PATHOLOGY: None    FINDINGS: Normal colonoscopy.  Diverticulosis was not identified.  Hemorrhoids were not  identified.    INDICATIONS: This is a 64 year old male in need of a colonoscopy for Screening colonoscopy.  The patient will be taken to the endoscopy suite for that procedure.    DESCRIPTIONS OF PROCEDURE IN DETAIL: After consent was obtained the patient was taken to the endoscopy suite and placed in the left lateral decubitus position.  The patient was identified and the correct patient was confirmed.  Monitored Anesthesia Care was given.  A time out was performed verifying the correct patient and the correct procedure.  The entire operative team was in agreement.  All necessary equipment and supplies were in the room.    Rectal exam was performed and no lesions of the anal canal were noted.  The colonoscope was inserted into the anus and passed without difficulty to the cecum.  The cecum was identified by the ileocecal valve, the coalescence of the tinea and the appendiceal orifice.  Upon withdrawal all walls of the colon were visualized.  There was a small polyp at 40cm on the scope removed with a cold snare.  Diverticulosis was not seen.  Upon reaching the rectum the scope was retroflexed and internal hemorrhoids were not  seen.  The scope was straightened back out and removed from the patient.  The patient was then taken to the recovery room in stable  condition tolerating the procedure well.      Prep: good    Withdrawal time was 10 minutes.    It is recommended that the patient have another colonoscopy pending pathology.

## 2024-11-18 NOTE — OR NURSING
Patient and responsible adult given discharge instructions with no questions regarding instructions. Cash score 20/20. Pain level 0/10.  Discharged from unit via walking. Patient discharged to home with friend rehan.

## 2024-11-18 NOTE — ANESTHESIA POSTPROCEDURE EVALUATION
Patient: Luis Max    Procedure: Procedure(s):  COLONOSCOPY WITH POLYPECTOMY       Anesthesia Type:  MAC    Note:  Disposition: Outpatient   Postop Pain Control: Uneventful            Sign Out: Well controlled pain   PONV: No   Neuro/Psych: Uneventful            Sign Out: Acceptable/Baseline neuro status   Airway/Respiratory: Uneventful            Sign Out: Acceptable/Baseline resp. status   CV/Hemodynamics: Uneventful            Sign Out: Acceptable CV status; No obvious hypovolemia; No obvious fluid overload   Other NRE: NONE   DID A NON-ROUTINE EVENT OCCUR? No       Last vitals:  Vitals Value Taken Time   /93 11/18/24 1305   Temp 97  F (36.1  C) 11/18/24 1305   Pulse 77 11/18/24 1305   Resp 16 11/18/24 1305   SpO2 97 % 11/18/24 1310   Vitals shown include unfiled device data.    Electronically Signed By: DARELL Ovalle CRNA  November 18, 2024  1:28 PM

## 2024-11-19 LAB
PATH REPORT.COMMENTS IMP SPEC: NORMAL
PATH REPORT.FINAL DX SPEC: NORMAL
PATH REPORT.GROSS SPEC: NORMAL
PATH REPORT.MICROSCOPIC SPEC OTHER STN: NORMAL
PATH REPORT.RELEVANT HX SPEC: NORMAL
PHOTO IMAGE: NORMAL

## 2024-12-17 DIAGNOSIS — R73.03 PREDIABETES: ICD-10-CM

## 2024-12-18 NOTE — TELEPHONE ENCOUNTER
Metformin      Last Written Prescription Date:  10/31/24  Last Fill Quantity: 60,   # refills: 5  Last Office Visit: 9/10/24  Future Office visit:    Next 5 appointments (look out 90 days)      Mar 11, 2025 9:00 AM  (Arrive by 8:45 AM)  Provider Visit with Kasey Mosquera MD  [facility] ([facility] ) [address]  [facility] [address]  [phone]             Routing refill request to provider for review/approval because:

## 2024-12-19 RX ORDER — METFORMIN HYDROCHLORIDE 500 MG/1
500 TABLET, EXTENDED RELEASE ORAL 2 TIMES DAILY WITH MEALS
Qty: 180 TABLET | Refills: 0 | Status: SHIPPED | OUTPATIENT
Start: 2024-12-19

## 2024-12-19 NOTE — TELEPHONE ENCOUNTER
"Biguanide Agents [id number]/17/2024 02:02 PM   Protocol Details Has GFR on file in past 12 months and most recent value is normal       No results found for: \"GFRESTIMATED\"    "

## 2025-02-19 DIAGNOSIS — E78.5 HYPERLIPIDEMIA, UNSPECIFIED HYPERLIPIDEMIA TYPE: ICD-10-CM

## 2025-02-19 RX ORDER — ROSUVASTATIN CALCIUM 20 MG/1
20 TABLET, COATED ORAL EVERY OTHER DAY
Qty: 45 TABLET | Refills: 1 | Status: SHIPPED | OUTPATIENT
Start: 2025-02-19

## 2025-02-19 NOTE — TELEPHONE ENCOUNTER
Crestor      Last Written Prescription Date:  01/02/24  Last Fill Quantity: 45,   # refills: 3  Last Office Visit: 09/10/24  Future Office visit:    Next 5 appointments (look out 90 days)      Mar 11, 2025 9:00 AM  (Arrive by 8:45 AM)  Provider Visit with Kasey Mosquera MD  [facility] ([facility] ) [address]  [facility] [address]  [phone]

## 2025-03-10 NOTE — PROGRESS NOTES
"  Assessment & Plan     1. Hyperlipidemia, unspecified hyperlipidemia type (Primary)  Labs updated, will adjust Crestor if needed.  Follow-up with results when available.  Otherwise, follow-up in clinic in six months, sooner as directed.  - Lipid Profile (Chol, Trig, HDL, LDL calc); Future  - ALT; Future  - Lipid Profile (Chol, Trig, HDL, LDL calc)  - ALT    2. Migraine without status migrainosus, not intractable, unspecified migraine type  As above, no changes    3. Prediabetes  Updated  - Hemoglobin A1c; Future  - Hemoglobin A1c           BMI  Estimated body mass index is 30.21 kg/m  as calculated from the following:    Height as of this encounter: 1.626 m (5' 4\").    Weight as of this encounter: 79.8 kg (176 lb).       The longitudinal plan of care for the diagnosis(es)/condition(s) as documented were addressed during this visit. Due to the added complexity in care, I will continue to support Luis in the subsequent management and with ongoing continuity of care.     Return in about 6 months (around 9/11/2025) for Physical Exam, Chronic Disease Management.      Nilson Smith is a 64 year old, presenting for the following health issues:  Lipids and Headache    HPI      Hyperlipidemia Follow-Up    Are you regularly taking any medication or supplement to lower your cholesterol?   Yes- Crestor 20 mg  Are you having muscle aches or other side effects that you think could be caused by your cholesterol lowering medication?  No    Migraine   Since your last clinic visit, how have your headaches changed?  Improved No longer get headaches or migraines   How often are you getting headaches or migraines? Years ago   Are you able to do normal daily activities when you have a migraine? No  Are you taking rescue/relief medications? (Select all that apply) No  How helpful is your rescue/relief medication?  I get total relief  Are you taking any medications to prevent migraines? (Select all that apply)  No  In the past 4 " "weeks, how often have you gone to urgent care or the emergency room because of your headaches?  0    Patient has also had mildly elevated HgbA1c.        Review of Systems  Constitutional, HEENT, cardiovascular, pulmonary, gi and gu systems are negative, except as otherwise noted.      Objective    /80 (BP Location: Right arm, Patient Position: Sitting, Cuff Size: Adult Large)   Pulse 97   Temp 97.3  F (36.3  C) (Tympanic)   Resp 16   Ht 1.626 m (5' 4\")   Wt 79.8 kg (176 lb)   SpO2 97%   BMI 30.21 kg/m    Body mass index is 30.21 kg/m .  Physical Exam   GENERAL: alert and no distress  PSYCH: mentation appears normal, affect normal/bright          Signed Electronically by: Kasey Mosquera MD    "

## 2025-03-11 ENCOUNTER — OFFICE VISIT (OUTPATIENT)
Dept: FAMILY MEDICINE | Facility: OTHER | Age: 65
End: 2025-03-11
Attending: FAMILY MEDICINE
Payer: COMMERCIAL

## 2025-03-11 VITALS
OXYGEN SATURATION: 97 % | HEIGHT: 64 IN | WEIGHT: 176 LBS | HEART RATE: 97 BPM | RESPIRATION RATE: 16 BRPM | BODY MASS INDEX: 30.05 KG/M2 | TEMPERATURE: 97.3 F | DIASTOLIC BLOOD PRESSURE: 80 MMHG | SYSTOLIC BLOOD PRESSURE: 130 MMHG

## 2025-03-11 DIAGNOSIS — R73.03 PREDIABETES: ICD-10-CM

## 2025-03-11 DIAGNOSIS — E78.5 HYPERLIPIDEMIA, UNSPECIFIED HYPERLIPIDEMIA TYPE: Primary | ICD-10-CM

## 2025-03-11 DIAGNOSIS — G43.909 MIGRAINE WITHOUT STATUS MIGRAINOSUS, NOT INTRACTABLE, UNSPECIFIED MIGRAINE TYPE: ICD-10-CM

## 2025-03-11 LAB
ALT SERPL W P-5'-P-CCNC: 31 U/L (ref 0–70)
CHOLEST SERPL-MCNC: 156 MG/DL
EST. AVERAGE GLUCOSE BLD GHB EST-MCNC: 131 MG/DL
FASTING STATUS PATIENT QL REPORTED: YES
HBA1C MFR BLD: 6.2 %
HDLC SERPL-MCNC: 43 MG/DL
LDLC SERPL CALC-MCNC: 95 MG/DL
NONHDLC SERPL-MCNC: 113 MG/DL
TRIGL SERPL-MCNC: 90 MG/DL

## 2025-03-11 PROCEDURE — 36415 COLL VENOUS BLD VENIPUNCTURE: CPT | Mod: ZL | Performed by: FAMILY MEDICINE

## 2025-03-11 PROCEDURE — 84460 ALANINE AMINO (ALT) (SGPT): CPT | Mod: ZL | Performed by: FAMILY MEDICINE

## 2025-03-11 PROCEDURE — 80061 LIPID PANEL: CPT | Mod: ZL | Performed by: FAMILY MEDICINE

## 2025-03-11 PROCEDURE — G0463 HOSPITAL OUTPT CLINIC VISIT: HCPCS

## 2025-03-11 PROCEDURE — 83036 HEMOGLOBIN GLYCOSYLATED A1C: CPT | Mod: ZL | Performed by: FAMILY MEDICINE

## 2025-03-11 ASSESSMENT — PAIN SCALES - GENERAL: PAINLEVEL_OUTOF10: NO PAIN (0)

## 2025-03-13 DIAGNOSIS — R73.03 PREDIABETES: ICD-10-CM

## 2025-03-13 RX ORDER — METFORMIN HYDROCHLORIDE 500 MG/1
1000 TABLET, EXTENDED RELEASE ORAL 2 TIMES DAILY WITH MEALS
Qty: 360 TABLET | Refills: 1 | Status: SHIPPED | OUTPATIENT
Start: 2025-03-13

## 2025-06-14 DIAGNOSIS — R73.03 PREDIABETES: ICD-10-CM

## 2025-06-16 RX ORDER — METFORMIN HYDROCHLORIDE 500 MG/1
500 TABLET, EXTENDED RELEASE ORAL 2 TIMES DAILY WITH MEALS
Qty: 180 TABLET | Refills: 3 | Status: SHIPPED | OUTPATIENT
Start: 2025-06-16

## 2025-06-16 NOTE — TELEPHONE ENCOUNTER
metFORMIN (GLUCOPHAGE XR) 500 MG 24 hr tablet       Last Written Prescription Date:  3/13/2025  Last Fill Quantity: 360,   # refills: 1  Last Office Visit: 3/11/2025  Future Office visit:    Next 5 appointments (look out 90 days)      Sep 12, 2025 8:30 AM  (Arrive by 8:15 AM)  Adult Preventative Visit with Kasey Mosquera MD  [facility] ([facility] ) [address]  [facility] [address]  [phone]

## 2025-06-16 NOTE — TELEPHONE ENCOUNTER
"Routing refill request to provider for review/approval because:    Biguanide Agents [id number]/14/2025 07:21 AM   Protocol Details   Medication is active on med list and the sig matches. RN to manually verify dose and sig if red X/fail.    Has GFR on file in past 12 months and most recent value is normal        No results found for: \"GFRESTIMATED\"    "

## (undated) DEVICE — CONNECTOR ERBEFLO 2 PORT 20325-215

## (undated) DEVICE — SOL WATER IRRIG 1000ML BOTTLE 2F7114

## (undated) DEVICE — SNARE ROT MED OVAL PLYPCTM 20MM/195CML/2.4MM M00561831

## (undated) DEVICE — TUBING SUCTION 20FT N620A

## (undated) DEVICE — ENDO TRAP POLYP E-TRAP 00711099

## (undated) DEVICE — SUCTION MANIFOLD NEPTUNE 2 SYS 1 PORT 702-025-000

## (undated) DEVICE — CANISTER SUCTION MEDI-VAC GUARDIAN 2000ML 90D 65651-220